# Patient Record
Sex: MALE | Race: WHITE | NOT HISPANIC OR LATINO | Employment: FULL TIME | URBAN - METROPOLITAN AREA
[De-identification: names, ages, dates, MRNs, and addresses within clinical notes are randomized per-mention and may not be internally consistent; named-entity substitution may affect disease eponyms.]

---

## 2023-10-15 PROBLEM — M54.16 BILATERAL LUMBAR RADICULOPATHY: Status: ACTIVE | Noted: 2023-10-15

## 2023-10-15 PROBLEM — M43.16 SPONDYLOLISTHESIS OF LUMBAR REGION: Status: ACTIVE | Noted: 2023-10-15

## 2023-10-15 PROBLEM — G83.4 CAUDA EQUINA SYNDROME (MULTI): Status: ACTIVE | Noted: 2023-10-15

## 2023-10-15 PROBLEM — R29.898 WEAKNESS OF BOTH LOWER EXTREMITIES: Status: ACTIVE | Noted: 2023-10-15

## 2023-10-15 PROBLEM — Z98.1 HISTORY OF FUSION OF LUMBAR SPINE: Status: ACTIVE | Noted: 2023-10-15

## 2023-10-15 PROBLEM — M48.061 DEGENERATIVE LUMBAR SPINAL STENOSIS: Status: ACTIVE | Noted: 2023-10-15

## 2023-10-15 PROBLEM — G47.00 INSOMNIA: Status: ACTIVE | Noted: 2023-10-15

## 2023-10-15 RX ORDER — ESZOPICLONE 3 MG/1
3 TABLET, FILM COATED ORAL NIGHTLY
COMMUNITY
End: 2024-04-25 | Stop reason: ALTCHOICE

## 2023-10-17 ENCOUNTER — OFFICE VISIT (OUTPATIENT)
Dept: NEUROSURGERY | Facility: CLINIC | Age: 73
End: 2023-10-17
Payer: MEDICARE

## 2023-10-17 VITALS
SYSTOLIC BLOOD PRESSURE: 118 MMHG | TEMPERATURE: 97.5 F | WEIGHT: 200 LBS | DIASTOLIC BLOOD PRESSURE: 72 MMHG | HEART RATE: 75 BPM | HEIGHT: 70 IN | BODY MASS INDEX: 28.63 KG/M2

## 2023-10-17 DIAGNOSIS — M54.16 BILATERAL LUMBAR RADICULOPATHY: Primary | ICD-10-CM

## 2023-10-17 PROCEDURE — 99213 OFFICE O/P EST LOW 20 MIN: CPT | Performed by: NEUROLOGICAL SURGERY

## 2023-10-17 PROCEDURE — 1036F TOBACCO NON-USER: CPT | Performed by: NEUROLOGICAL SURGERY

## 2023-10-17 PROCEDURE — 1159F MED LIST DOCD IN RCRD: CPT | Performed by: NEUROLOGICAL SURGERY

## 2023-10-17 PROCEDURE — 1125F AMNT PAIN NOTED PAIN PRSNT: CPT | Performed by: NEUROLOGICAL SURGERY

## 2023-10-17 RX ORDER — IBUPROFEN 200 MG
200 TABLET ORAL EVERY 8 HOURS PRN
COMMUNITY

## 2023-10-17 ASSESSMENT — PATIENT HEALTH QUESTIONNAIRE - PHQ9
SUM OF ALL RESPONSES TO PHQ9 QUESTIONS 1 & 2: 0
1. LITTLE INTEREST OR PLEASURE IN DOING THINGS: NOT AT ALL
2. FEELING DOWN, DEPRESSED OR HOPELESS: NOT AT ALL

## 2023-10-17 ASSESSMENT — PAIN SCALES - GENERAL: PAINLEVEL: 3

## 2023-10-31 NOTE — PROGRESS NOTES
[facility] [place]  Department of Neurological Surgery  Established Patient Visit    History of Present Illness  The patient is a 73-year-old male who returns to clinic for ongoing evaluation and management of lower extremity pain and weakness. He previously underwent an L4-5 laminectomy and L4-S1 posterior instrumented fusion in May 2022 at an outside hospital for treatment of acute cauda equina syndrome. He made a good recovery following surgery, but in late 2022 developed recurrent right lower extremity pain and weakness as well as worsening sensation in both legs. His prior imaging has shown a persistent L4-5 anterolisthesis as well as new mobile L3-4 retrolisthesis. There is also what appears to be extensive epidural lipomatosis at L5-S1. He has a pseudoarthrosis. He is moderate to severe central stenosis at L2-3, L3-4 and L5-S1, as well as severe bilateral foraminal stenosis at L4-5.  His prior EMG/nerve conduction study from April 2023 showed bilateral L5 and S1 radiculopathies, which were not previously seen.  When I last saw him in April 2023, we had agreed to hold off on any further surgery for the time being.  We agreed to proceed with another EMG/nerve conduction study in 3 months.    At this time, the patient states that overall things are fairly stable.  He did have an episode in May when he lost feeling in his legs and his legs gave out.  This was an isolated occurrence.  At this time, he reports low back pain when upright, which is worse with activity.  He also describes aching pain and numbness in his legs.    Patient's BMI is Body mass index is 29.11 kg/m².    14/14 systems reviewed and negative other than what is listed in the history of present illness    Patient Active Problem List   Diagnosis    Cauda equina syndrome (CMS/HCC)    Degenerative lumbar spinal stenosis    Spondylolisthesis of lumbar region    History of fusion of lumbar spine    Weakness of both lower extremities     Bilateral lumbar radiculopathy    Insomnia     History reviewed. No pertinent past medical history.  History reviewed. No pertinent surgical history.  Social History     Tobacco Use    Smoking status: Never    Smokeless tobacco: Never   Substance Use Topics    Alcohol use: Never     family history is not on file.    Current Outpatient Medications:     eszopiclone (Lunesta) 3 mg tablet, Take 1 tablet (3 mg) by mouth once daily at bedtime. Take immediately before bedtime, Disp: , Rfl:     ibuprofen 200 mg tablet, Take 1 tablet (200 mg) by mouth every 8 hours if needed., Disp: , Rfl:   Allergies   Allergen Reactions    Cefuroxime Rash       Physical Examination:    General: Well developed, awake/alert/oriented x3, no distress, alert and cooperative  Skin: Warm and dry, no lesions, no rashes  ENMT: Mucous membranes moist, no apparent injury, no lesions seen  Head/Neck: Neck Supple, no apparent injury  Respiratory/Thorax: Normal breath sounds with good chest expansion, thorax symmetric  Cardiovascular: No pitting edema, no JVD    Motor Strength: 4/5 strength in right TA/EHL, otherwise 5/5 throughout bilateral lower extremities    Muscle Bulk: Normal and symmetric in all extremities    Posture:   -- Cervical: Normal  -- Thoracic: Normal  -- Lumbar : Normal  Paraspinal muscle spasm/tenderness absent.     Sensation: Subjectively decreased over the lateral aspect of both calves into the feet      Results:  No new imaging to review    An EMG/nerve conduction study performed on July 19, 2023 was interpreted as showing chronic moderate to severe right L5 radiculopathy    Assessment and Plan:      Ernie Flores is a 73-year-old male who returns to clinic for ongoing evaluation and management of lower extremity pain and weakness. He previously underwent an L4-5 laminectomy and L4-S1 posterior instrumented fusion in May 2022 at an outside hospital for treatment of acute cauda equina syndrome. He made a good recovery following  surgery, but in late 2022 developed recurrent right lower extremity pain and weakness as well as worsening sensation in both legs. His prior imaging has shown a persistent L4-5 anterolisthesis as well as new mobile L3-4 retrolisthesis. There is also what appears to be extensive epidural lipomatosis at L5-S1. He has a pseudoarthrosis. He is moderate to severe central stenosis at L2-3, L3-4 and L5-S1, as well as severe bilateral foraminal stenosis at L4-5.  His prior EMG/nerve conduction study from April 2023 showed bilateral L5 and S1 radiculopathies, which were not previously seen.  When I last saw him in April 2023, we had agreed to hold off on any further surgery for the time being.  We agreed to proceed with another EMG/nerve conduction study in 3 months.  At this time, he states that overall things are fairly stable.  He has a persistent right foot drop as well as persistent numbness in an L5 distribution bilaterally.  His repeat EMG performed in July 2023 showed chronic moderate to severe right L5 radiculopathy.  At this time, we have agreed to continue to hold off on further surgery.  I will plan to see him back in clinic in 6 months.  We have agreed to have another EMG/nerve conduction study performed prior to that visit.  We will also get scoliosis films and lumbar flexion and extension films prior to that visit.    I have reviewed all prior documentation and reviewed the electronic medical record since admission. I have personally have reviewed all advanced imaging not just the reports and used my interpretation as documented as the relevant findings. I have reviewed the risks and benefits of all treatment recommendations listed in this note with the patient and family. I spent a total of 30 minutes in service to this patient's care during this date of service.      The above clinical summary has been dictated with voice recognition software. It has not been proofread for grammatical errors, typographical  mistakes, or other semantic inconsistencies.    Thank you for visiting our office today. It was our pleasure to take part in your healthcare.     Do not hesitate to call with any questions regarding your plan of care after leaving at [phone] M-F 8am-4pm.     To clinicians, thank you very much for this kind referral. It is a privilege to partner with you in the care of your patients. My office would be delighted to assist you with any further consultations or with questions regarding the plan of care outlined. Do not hesitate to call the office or contact me directly.       Sincerely,      Geraldo Clark MD PhD  Attending Neurosurgeon  [facility]   of Neurological Surgery  [facility]    [address]  [place], [place] [address] [place] [address]  [address] [place] [address]  [address]  [place] [address]    Office: [phone]  Fax: [phone]

## 2023-12-12 RX ORDER — ESZOPICLONE 3 MG/1
3 TABLET, FILM COATED ORAL NIGHTLY
Qty: 30 TABLET | Refills: 5 | OUTPATIENT
Start: 2023-12-12 | End: 2024-01-11

## 2024-04-23 ENCOUNTER — APPOINTMENT (OUTPATIENT)
Dept: NEUROSURGERY | Facility: CLINIC | Age: 74
End: 2024-04-23
Payer: MEDICARE

## 2024-04-25 ENCOUNTER — OFFICE VISIT (OUTPATIENT)
Dept: NEUROSURGERY | Facility: CLINIC | Age: 74
End: 2024-04-25
Payer: MEDICARE

## 2024-04-25 VITALS
BODY MASS INDEX: 29.35 KG/M2 | HEART RATE: 82 BPM | HEIGHT: 70 IN | TEMPERATURE: 97.1 F | SYSTOLIC BLOOD PRESSURE: 132 MMHG | DIASTOLIC BLOOD PRESSURE: 76 MMHG | WEIGHT: 205 LBS

## 2024-04-25 DIAGNOSIS — Z98.1 HISTORY OF FUSION OF LUMBAR SPINE: ICD-10-CM

## 2024-04-25 DIAGNOSIS — M43.16 SPONDYLOLISTHESIS OF LUMBAR REGION: ICD-10-CM

## 2024-04-25 DIAGNOSIS — G47.00 INSOMNIA, UNSPECIFIED TYPE: Primary | ICD-10-CM

## 2024-04-25 DIAGNOSIS — M54.16 BILATERAL LUMBAR RADICULOPATHY: Primary | ICD-10-CM

## 2024-04-25 PROCEDURE — 1159F MED LIST DOCD IN RCRD: CPT | Performed by: NEUROLOGICAL SURGERY

## 2024-04-25 PROCEDURE — 1036F TOBACCO NON-USER: CPT | Performed by: NEUROLOGICAL SURGERY

## 2024-04-25 PROCEDURE — 1125F AMNT PAIN NOTED PAIN PRSNT: CPT | Performed by: NEUROLOGICAL SURGERY

## 2024-04-25 PROCEDURE — 99214 OFFICE O/P EST MOD 30 MIN: CPT | Performed by: NEUROLOGICAL SURGERY

## 2024-04-25 RX ORDER — ESZOPICLONE 3 MG/1
3 TABLET, FILM COATED ORAL NIGHTLY
Qty: 90 TABLET | Refills: 0 | Status: SHIPPED | OUTPATIENT
Start: 2024-04-25 | End: 2024-07-24

## 2024-04-25 ASSESSMENT — PATIENT HEALTH QUESTIONNAIRE - PHQ9
2. FEELING DOWN, DEPRESSED OR HOPELESS: NOT AT ALL
1. LITTLE INTEREST OR PLEASURE IN DOING THINGS: NOT AT ALL
SUM OF ALL RESPONSES TO PHQ9 QUESTIONS 1 & 2: 0

## 2024-04-25 ASSESSMENT — PAIN SCALES - GENERAL: PAINLEVEL: 6

## 2024-04-25 NOTE — PROGRESS NOTES
[facility] [place]  Department of Neurological Surgery  Established Patient Visit    History of Present Illness  Ernie Flores is a 73 y.o. year old male who presents to the spine clinic in follow up for ongoing evaluation and management of lower extremity pain and weakness. He previously underwent an L4-5 laminectomy and L4-S1 posterior instrumented fusion in May 2022 at an outside hospital for treatment of acute cauda equina syndrome. He made a good recovery following surgery, but in late 2022 developed recurrent right lower extremity pain and weakness as well as worsening sensation in both legs. His prior imaging has shown a persistent L4-5 anterolisthesis as well as new mobile L3-4 retrolisthesis. There is also what appears to be extensive epidural lipomatosis at L5-S1. He has a pseudoarthrosis. He has moderate to severe central stenosis at L2-3, L3-4 and L5-S1, as well as severe bilateral foraminal stenosis at L4-5.  His prior EMG/nerve conduction study from April 2023 showed bilateral L5 and S1 radiculopathies, which were not previously seen.  His repeat EMG performed in July 2023 showed chronic moderate to severe right L5 radiculopathy.  I last saw him in October 2023.  At this time, he states that his low back and right lower extremity pain are mildly worse than they were in October.  That said, he is still able to do activities he enjoys like gardening.    Patient's BMI is Body mass index is 29.84 kg/m².    14/14 systems reviewed and negative other than what is listed in the history of present illness    Patient Active Problem List   Diagnosis    Cauda equina syndrome (Multi)    Degenerative lumbar spinal stenosis    Spondylolisthesis of lumbar region    History of fusion of lumbar spine    Weakness of both lower extremities    Bilateral lumbar radiculopathy    Insomnia     No past medical history on file.  No past surgical history on file.  Social History     Tobacco Use    Smoking  status: Never    Smokeless tobacco: Never   Substance Use Topics    Alcohol use: Never     family history is not on file.    Current Outpatient Medications:     eszopiclone (Lunesta) 3 mg tablet, Take 1 tablet (3 mg) by mouth once daily at bedtime. Take immediately before bedtime, Disp: , Rfl:     ibuprofen 200 mg tablet, Take 1 tablet (200 mg) by mouth every 8 hours if needed., Disp: , Rfl:   Allergies   Allergen Reactions    Cefuroxime Rash       Physical Examination:  General: well developed, awake/alert/oriented x3, no distress, alert and cooperative  Head/Neck: neck Supple, no apparent injury  ENMT: mucous membranes moist, no apparent injury, no lesions seen  Cardiovascular: no pitting edema, no JVD  Respiratory/Thorax: Normal breath sounds with good chest expansion, thorax symmetric  Skin: warm and dry, no lesions, no rashes    Neurological/Musculoskeletal:    - Posture: normal coronal & sagittal alignment    - Range of motion: Decreased lumbar range of motion    - Muscle Bulk: normal and symmetric in all extremities    -Mild tenderness to palpation over the mid lumbar spine    - Motor Strength: 4/5 strength in right TA/EHL, otherwise 5/5 strength throughout bilateral lower extremities    - Sensation: Subjectively decreased over the lateral aspect of both calves into the feet      Results:  No new imaging    Assessment and Plan:  Ernie Flores is a 73 y.o. year old male who presents to the spine clinic in follow up for ongoing evaluation and management of lower extremity pain and weakness. He previously underwent an L4-5 laminectomy and L4-S1 posterior instrumented fusion in May 2022 at an outside hospital for treatment of acute cauda equina syndrome. He made a good recovery following surgery, but in late 2022 developed recurrent right lower extremity pain and weakness as well as worsening sensation in both legs. His prior imaging has shown a persistent L4-5 anterolisthesis as well as new mobile L3-4  retrolisthesis. There is also what appears to be extensive epidural lipomatosis at L5-S1. He has a pseudoarthrosis. He has moderate to severe central stenosis at L2-3, L3-4 and L5-S1, as well as severe bilateral foraminal stenosis at L4-5.  His prior EMG/nerve conduction study from April 2023 showed bilateral L5 and S1 radiculopathies, which were not previously seen.  His repeat EMG performed in July 2023 showed chronic moderate to severe right L5 radiculopathy.  I last saw him in October 2023.  At this time, he states that his low back and right lower extremity pain are mildly worse than they were in October.  That said, he is still able to do activities he enjoys like gardening.  I had a lengthy discussion with the patient regarding his condition and treatment options.  We again discussed the possibility of proceeding with revision/extension surgery, likely L3- pelvis.  At this time, he would like to defer surgery.  Bushra this is reasonable.  I will plan to see him back in clinic in 6 months to assess his progress.  He will have a new EMG as well as EOS films and flexion/extension x-rays at that time.      I have reviewed all prior documentation and reviewed the electronic medical record since admission. I have personally have reviewed all advanced imaging not just the reports and used my interpretation as documented as the relevant findings. I have reviewed the risks and benefits of all treatment recommendations listed in this note with the patient and family. I spent a total of 30 minutes in service to this patient's care during this date of service.      The above clinical summary has been dictated with voice recognition software. It has not been proofread for grammatical errors, typographical mistakes, or other semantic inconsistencies.    Thank you for visiting our office today. It was our pleasure to take part in your healthcare.     Do not hesitate to call with any questions regarding your plan of care  after leaving at [phone] M-F 8am-4pm.     To clinicians, thank you very much for this kind referral. It is a privilege to partner with you in the care of your patients. My office would be delighted to assist you with any further consultations or with questions regarding the plan of care outlined. Do not hesitate to call the office or contact me directly.       Sincerely,      Geraldo Clark MD PhD  Attending Neurosurgeon  [facility]   of Neurological Surgery  [facility]    [address]  [place] [address]    [facility]  [address] [facility]  [address]  [place] [address]    Office: [phone]  Fax: [phone]

## 2024-09-24 ENCOUNTER — HOSPITAL ENCOUNTER (OUTPATIENT)
Dept: RADIOLOGY | Facility: HOSPITAL | Age: 74
Discharge: HOME | End: 2024-09-24
Payer: MEDICARE

## 2024-09-24 DIAGNOSIS — M43.16 SPONDYLOLISTHESIS OF LUMBAR REGION: ICD-10-CM

## 2024-09-24 DIAGNOSIS — Z98.1 HISTORY OF FUSION OF LUMBAR SPINE: ICD-10-CM

## 2024-09-24 PROCEDURE — 72082 X-RAY EXAM ENTIRE SPI 2/3 VW: CPT

## 2024-09-24 PROCEDURE — 72084 X-RAY EXAM ENTIRE SPI 6/> VW: CPT | Performed by: STUDENT IN AN ORGANIZED HEALTH CARE EDUCATION/TRAINING PROGRAM

## 2024-09-24 PROCEDURE — 72120 X-RAY BEND ONLY L-S SPINE: CPT

## 2024-10-10 ENCOUNTER — APPOINTMENT (OUTPATIENT)
Dept: NEUROSURGERY | Facility: CLINIC | Age: 74
End: 2024-10-10
Payer: MEDICARE

## 2024-10-29 ENCOUNTER — APPOINTMENT (OUTPATIENT)
Facility: CLINIC | Age: 74
End: 2024-10-29
Payer: MEDICARE

## 2024-10-29 VITALS
DIASTOLIC BLOOD PRESSURE: 70 MMHG | TEMPERATURE: 97.2 F | HEART RATE: 90 BPM | HEIGHT: 70 IN | WEIGHT: 210 LBS | BODY MASS INDEX: 30.06 KG/M2 | SYSTOLIC BLOOD PRESSURE: 120 MMHG

## 2024-10-29 DIAGNOSIS — M43.16 SPONDYLOLISTHESIS OF LUMBAR REGION: ICD-10-CM

## 2024-10-29 DIAGNOSIS — M54.16 BILATERAL LUMBAR RADICULOPATHY: Primary | ICD-10-CM

## 2024-10-29 RX ORDER — DIAZEPAM 5 MG/1
5 TABLET ORAL
Qty: 1 TABLET | Refills: 0 | Status: SHIPPED | OUTPATIENT
Start: 2024-10-29 | End: 2024-10-29

## 2024-10-29 ASSESSMENT — PAIN SCALES - GENERAL: PAINLEVEL_OUTOF10: 5

## 2024-10-29 ASSESSMENT — PATIENT HEALTH QUESTIONNAIRE - PHQ9
1. LITTLE INTEREST OR PLEASURE IN DOING THINGS: NOT AT ALL
SUM OF ALL RESPONSES TO PHQ9 QUESTIONS 1 & 2: 0
2. FEELING DOWN, DEPRESSED OR HOPELESS: NOT AT ALL

## 2024-11-03 ENCOUNTER — HOSPITAL ENCOUNTER (OUTPATIENT)
Dept: RADIOLOGY | Facility: HOSPITAL | Age: 74
Discharge: HOME | End: 2024-11-03
Payer: MEDICARE

## 2024-11-03 DIAGNOSIS — M54.16 BILATERAL LUMBAR RADICULOPATHY: ICD-10-CM

## 2024-11-03 PROCEDURE — 72148 MRI LUMBAR SPINE W/O DYE: CPT

## 2024-11-03 PROCEDURE — 72148 MRI LUMBAR SPINE W/O DYE: CPT | Performed by: RADIOLOGY

## 2024-11-27 ENCOUNTER — APPOINTMENT (OUTPATIENT)
Facility: CLINIC | Age: 74
End: 2024-11-27
Payer: MEDICARE

## 2024-12-03 ENCOUNTER — OFFICE VISIT (OUTPATIENT)
Facility: CLINIC | Age: 74
End: 2024-12-03
Payer: MEDICARE

## 2024-12-03 VITALS
TEMPERATURE: 97.5 F | DIASTOLIC BLOOD PRESSURE: 82 MMHG | HEIGHT: 70 IN | SYSTOLIC BLOOD PRESSURE: 118 MMHG | BODY MASS INDEX: 29.78 KG/M2 | WEIGHT: 208 LBS | HEART RATE: 78 BPM

## 2024-12-03 DIAGNOSIS — M43.16 SPONDYLOLISTHESIS OF LUMBAR REGION: ICD-10-CM

## 2024-12-03 DIAGNOSIS — S32.009K LUMBAR PSEUDOARTHROSIS: ICD-10-CM

## 2024-12-03 DIAGNOSIS — M54.16 BILATERAL LUMBAR RADICULOPATHY: Primary | ICD-10-CM

## 2024-12-03 ASSESSMENT — PATIENT HEALTH QUESTIONNAIRE - PHQ9
SUM OF ALL RESPONSES TO PHQ9 QUESTIONS 1 & 2: 1
10. IF YOU CHECKED OFF ANY PROBLEMS, HOW DIFFICULT HAVE THESE PROBLEMS MADE IT FOR YOU TO DO YOUR WORK, TAKE CARE OF THINGS AT HOME, OR GET ALONG WITH OTHER PEOPLE: SOMEWHAT DIFFICULT
1. LITTLE INTEREST OR PLEASURE IN DOING THINGS: SEVERAL DAYS
2. FEELING DOWN, DEPRESSED OR HOPELESS: NOT AT ALL

## 2024-12-03 ASSESSMENT — PAIN SCALES - GENERAL: PAINLEVEL_OUTOF10: 6

## 2024-12-03 NOTE — PROGRESS NOTES
[facility] [place]  Department of Neurological Surgery  Established Patient Visit    History of Present Illness  Ernie Flores is a 74 y.o. year old male who presents to the spine clinic in follow up with low back pain as well as lower extremity pain, weakness and paresthesias.  He previously underwent an L4-5 laminectomy and L4-S1 posterior instrumented fusion in May 2022 at an outside hospital for treatment of acute cauda equina syndrome. He made a good recovery following surgery, but in late 2022 developed recurrent right lower extremity pain and weakness as well as worsening sensation in both legs. His prior imaging has shown a persistent L4-5 anterolisthesis as well as new mobile L3-4 retrolisthesis. There is also what appears to be extensive epidural lipomatosis at L5-S1. He has a pseudoarthrosis.  His prior EMG/nerve conduction study from April 2023 showed bilateral L5 and S1 radiculopathies, which were not previously seen.  His repeat EMG performed in July 2023 showed chronic moderate to severe right L5 radiculopathy.  When I last saw him in October 2024, I had ordered a new MRI of the lumbar spine for further evaluation.  He is here today to go over the results of that study as well as for ongoing treatment discussion.  At this time, he states that he continues to have significant low back pain, which is intermittent.  He also has numbness over the anterior thighs as well as his persistent right dorsiflexion weakness.    BMI: Body mass index is 30.28 kg/m².    14/14 systems reviewed and negative other than what is listed in the history of present illness    Patient Active Problem List   Diagnosis    Cauda equina syndrome (Multi)    Degenerative lumbar spinal stenosis    Spondylolisthesis of lumbar region    History of fusion of lumbar spine    Weakness of both lower extremities    Bilateral lumbar radiculopathy    Insomnia     No past medical history on file.  No past surgical history on  file.  Social History     Tobacco Use    Smoking status: Never    Smokeless tobacco: Never   Substance Use Topics    Alcohol use: Never     family history is not on file.    Current Outpatient Medications:     eszopiclone (Lunesta) 3 mg tablet, Take 1 tablet (3 mg) by mouth once daily at bedtime. Take immediately before bedtime, Disp: 90 tablet, Rfl: 0    ibuprofen 200 mg tablet, Take 1 tablet (200 mg) by mouth every 8 hours if needed., Disp: , Rfl:   Allergies   Allergen Reactions    Cefuroxime Rash       Physical Examination:  General: well developed, awake/alert/oriented x3, no distress, alert and cooperative  Head/Neck: neck Supple, no apparent injury  ENMT: mucous membranes moist, no apparent injury, no lesions seen  Cardiovascular: no pitting edema, no JVD  Respiratory/Thorax: Normal breath sounds with good chest expansion, thorax symmetric  Skin: warm and dry, no lesions, no rashes     Neurological/Musculoskeletal:     - Posture: normal coronal & sagittal alignment     - Range of motion: Decreased lumbar range of motion     - Muscle Bulk: normal and symmetric in all extremities     -Mild tenderness to palpation over the mid lumbar spine     - Motor Strength: 4/5 strength in right TA/EHL, otherwise 5/5 strength throughout bilateral lower extremities     - Sensation: Subjectively decreased over the anterior thighs and lateral aspect of both calves into the feet      Results:  I personally reviewed and interpreted the imaging results, which included an MRI of the lumbar spine performed on November 3, 2024.  This shows a grade 1 anterolisthesis at L4-5 as well as grade 2 retrolisthesis at L3-4.  There is severe bilateral foraminal stenosis at L3-4.  There is moderate central stenosis at L2-3 and L3-4.    Assessment and Plan:  Ernie Flores is a 74 y.o. year old male who presents to the spine clinic in follow up with low back pain as well as lower extremity pain, weakness and paresthesias.  He previously underwent  an L4-5 laminectomy and L4-S1 posterior instrumented fusion in May 2022 at an outside hospital for treatment of acute cauda equina syndrome. He made a good recovery following surgery, but in late 2022 developed recurrent right lower extremity pain and weakness as well as worsening sensation in both legs. His prior imaging has shown a persistent L4-5 anterolisthesis as well as new mobile L3-4 retrolisthesis. There is also what appears to be extensive epidural lipomatosis at L5-S1. He has a pseudoarthrosis.  His prior EMG/nerve conduction study from April 2023 showed bilateral L5 and S1 radiculopathies, which were not previously seen.  His repeat EMG performed in July 2023 showed chronic moderate to severe right L5 radiculopathy.  When I last saw him in October 2024, I had ordered a new MRI of the lumbar spine, which showed a grade 1 anterolisthesis at L4-5 as well as grade 2 retrolisthesis at L3-4.  There is severe bilateral foraminal stenosis at L3-4.  There is moderate central stenosis at L2-3 and L3-4.  At this time, he states that he continues to have significant low back pain, which is intermittent.  He also has numbness over the anterior thighs as well as his persistent right dorsiflexion weakness.  I had a lengthy discussion with the patient regarding their condition and treatment options.  At this time, I am recommending an L3-4 XLIF and L3-S1 revision/extension posterior fusion.  This would be to treat his pseudoarthrosis at L4-S1 as well as to reduce his L4-5 anterolisthesis.  I discussed both further conservative care as well as different surgical approaches. I went over the risks associated with surgery, including infection, bleeding, neurologic injury, spinal fluid leak, and the need for further procedures. All of the patient's questions were answered and they have elected to proceed with surgery.        I have reviewed all prior documentation and reviewed the electronic medical record since admission. I  have personally have reviewed all advanced imaging not just the reports and used my interpretation as documented as the relevant findings. I have reviewed the risks and benefits of all treatment recommendations listed in this note with the patient and family.       The above clinical summary has been dictated with voice recognition software. It has not been proofread for grammatical errors, typographical mistakes, or other semantic inconsistencies.    Thank you for visiting our office today. It was our pleasure to take part in your healthcare.     Do not hesitate to call with any questions regarding your plan of care after leaving at [phone] M-F 8am-4pm.     To clinicians, thank you very much for this kind referral. It is a privilege to partner with you in the care of your patients. My office would be delighted to assist you with any further consultations or with questions regarding the plan of care outlined. Do not hesitate to call the office or contact me directly.       Sincerely,      Geraldo Clark MD PhD  Attending Neurosurgeon  [facility]   of Neurological Surgery  [facility]    [address]  [place] [address]    [facility]  [address]  [address]  [place] [address]    Office: [phone]  Fax: [phone]

## 2025-01-13 ENCOUNTER — CLINICAL SUPPORT (OUTPATIENT)
Dept: PREADMISSION TESTING | Facility: HOSPITAL | Age: 75
End: 2025-01-13
Payer: MEDICARE

## 2025-01-13 NOTE — CPM/PAT H&P
[facility]/PAT Evaluation       Name: Ernie Flores (Ernie Flores)  /Age: 1950/74 y.o.     {[facility] Visit Type:83485}      Chief Complaint: ***    HPI      Ernie Flores is scheduled for L3-4 Spine Lumbar Fusion Extreme Lateral XLIF; L3-S1 revision/extension posterior fusion with L4-5 and L5-S1 transforaminal lumbar interbody fusion on 25    **Data Input  No past medical history on file.    No past surgical history on file.    Patient  has no history on file for sexual activity.    No family history on file.    Allergies   Allergen Reactions    Cefuroxime Rash       Prior to Admission medications    Medication Sig Start Date End Date Taking? Authorizing Provider   eszopiclone (Lunesta) 3 mg tablet Take 1 tablet (3 mg) by mouth once daily at bedtime. Take immediately before bedtime 4/25/24 12/3/24  Geraldo Clark MD PhD   ibuprofen 200 mg tablet Take 1 tablet (200 mg) by mouth every 8 hours if needed.    Historical Provider, MD MORE [facility] Physical Exam     Airway      Testing/Diagnostic:       - MR Lumbar Spine: 24  IMPRESSION:  Postoperative changes status post L4-S1 laminectomy and bilateral pedicle screw/ananda fusion from L4-S1. As demonstrated on radiographic evaluation there is worsening degenerative change at L3-L4 with retrolisthesis and resultant severe bilateral L3 neural foraminal narrowing with compression of the exiting nerve roots. No spinal canal stenosis at this level.  Mild-to-moderate spinal canal stenosis at L2-L3, similar to prior  myelogram.          Patient Specialist/PCP:         NeuroSx: Geraldo Clark 24 follow up with low back pain as well as lower extremity pain, weakness and paresthesias.  He previously underwent an L4-5 laminectomy and L4-S1 posterior instrumented fusion in May 2022 at an outside hospital for treatment of acute cauda equina syndrome.   **MRI of the lumbar spine performed on November 3, 2024. This shows a grade 1 anterolisthesis at L4-5 as  well as grade 2 retrolisthesis at L3-4. There is severe bilateral foraminal stenosis at L3-4. There is moderate central stenosis at L2-3 and L3-4.         _______________________________________________________________  **Data input only. No medications, history or providers verified            Alyse Everett LPN  Preadmission Testing            Visit Vitals  Smoking Status Never       DASI Risk Score    No data to display       Caprini DVT Assessment    No data to display       Modified Frailty Index    No data to display       CHADS2 Stroke Risk  Current as of 4 days ago        N/A 3 to 100%: High Risk   2 to < 3%: Medium Risk   0 to < 2%: Low Risk     Last Change: N/A          This score determines the patient's risk of having a stroke if the patient has atrial fibrillation.        This score is not applicable to this patient. Components are not calculated.          Revised Cardiac Risk Index    No data to display       Apfel Simplified Score    No data to display       Risk Analysis Index Results This Encounter    No data found in the last 10 encounters.       Prodigy: High Risk  Total Score: 20              Prodigy Age Score      Prodigy Gender Score          ARISCAT Score for Postoperative Pulmonary Complications    No data to display       Leon Perioperative Risk for Myocardial Infarction or Cardiac Arrest (HARITHA)    No data to display         Assessment and Plan:     {[facility] ASSESSMENT AND PLAN:69622}

## 2025-01-17 ENCOUNTER — PRE-ADMISSION TESTING (OUTPATIENT)
Dept: PREADMISSION TESTING | Facility: HOSPITAL | Age: 75
End: 2025-01-17
Payer: MEDICARE

## 2025-01-17 ENCOUNTER — HOSPITAL ENCOUNTER (OUTPATIENT)
Dept: CARDIOLOGY | Facility: HOSPITAL | Age: 75
Discharge: HOME | End: 2025-01-17
Payer: MEDICARE

## 2025-01-17 ENCOUNTER — HOSPITAL ENCOUNTER (OUTPATIENT)
Dept: RADIOLOGY | Facility: HOSPITAL | Age: 75
Discharge: HOME | End: 2025-01-17
Payer: MEDICARE

## 2025-01-17 VITALS
TEMPERATURE: 97.4 F | HEIGHT: 69 IN | OXYGEN SATURATION: 98 % | HEART RATE: 64 BPM | SYSTOLIC BLOOD PRESSURE: 116 MMHG | DIASTOLIC BLOOD PRESSURE: 75 MMHG | BODY MASS INDEX: 32.78 KG/M2 | WEIGHT: 221.3 LBS

## 2025-01-17 DIAGNOSIS — S32.009K LUMBAR PSEUDOARTHROSIS: ICD-10-CM

## 2025-01-17 DIAGNOSIS — M43.16 SPONDYLOLISTHESIS OF LUMBAR REGION: ICD-10-CM

## 2025-01-17 DIAGNOSIS — M54.16 BILATERAL LUMBAR RADICULOPATHY: ICD-10-CM

## 2025-01-17 LAB
ABO GROUP (TYPE) IN BLOOD: NORMAL
ANION GAP SERPL CALC-SCNC: 15 MMOL/L (ref 10–20)
ANTIBODY SCREEN: NORMAL
BASOPHILS # BLD AUTO: 0.06 X10*3/UL (ref 0–0.1)
BASOPHILS NFR BLD AUTO: 0.8 %
BUN SERPL-MCNC: 26 MG/DL (ref 6–23)
CALCIUM SERPL-MCNC: 9.1 MG/DL (ref 8.6–10.6)
CHLORIDE SERPL-SCNC: 104 MMOL/L (ref 98–107)
CO2 SERPL-SCNC: 26 MMOL/L (ref 21–32)
CREAT SERPL-MCNC: 1.13 MG/DL (ref 0.5–1.3)
EGFRCR SERPLBLD CKD-EPI 2021: 67 ML/MIN/1.73M*2
EOSINOPHIL # BLD AUTO: 0.15 X10*3/UL (ref 0–0.4)
EOSINOPHIL NFR BLD AUTO: 2 %
ERYTHROCYTE [DISTWIDTH] IN BLOOD BY AUTOMATED COUNT: 12.8 % (ref 11.5–14.5)
GLUCOSE SERPL-MCNC: 100 MG/DL (ref 74–99)
HCT VFR BLD AUTO: 43.4 % (ref 41–52)
HGB BLD-MCNC: 14.9 G/DL (ref 13.5–17.5)
IMM GRANULOCYTES # BLD AUTO: 0.08 X10*3/UL (ref 0–0.5)
IMM GRANULOCYTES NFR BLD AUTO: 1.1 % (ref 0–0.9)
LYMPHOCYTES # BLD AUTO: 1.14 X10*3/UL (ref 0.8–3)
LYMPHOCYTES NFR BLD AUTO: 15.5 %
MCH RBC QN AUTO: 31.8 PG (ref 26–34)
MCHC RBC AUTO-ENTMCNC: 34.3 G/DL (ref 32–36)
MCV RBC AUTO: 93 FL (ref 80–100)
MONOCYTES # BLD AUTO: 0.73 X10*3/UL (ref 0.05–0.8)
MONOCYTES NFR BLD AUTO: 9.9 %
NEUTROPHILS # BLD AUTO: 5.19 X10*3/UL (ref 1.6–5.5)
NEUTROPHILS NFR BLD AUTO: 70.7 %
NRBC BLD-RTO: 0 /100 WBCS (ref 0–0)
PLATELET # BLD AUTO: 218 X10*3/UL (ref 150–450)
POTASSIUM SERPL-SCNC: 4.8 MMOL/L (ref 3.5–5.3)
PREALB SERPL-MCNC: 26.3 MG/DL (ref 18–40)
RBC # BLD AUTO: 4.69 X10*6/UL (ref 4.5–5.9)
RH FACTOR (ANTIGEN D): NORMAL
SODIUM SERPL-SCNC: 140 MMOL/L (ref 136–145)
WBC # BLD AUTO: 7.4 X10*3/UL (ref 4.4–11.3)

## 2025-01-17 PROCEDURE — 85025 COMPLETE CBC W/AUTO DIFF WBC: CPT

## 2025-01-17 PROCEDURE — 84134 ASSAY OF PREALBUMIN: CPT

## 2025-01-17 PROCEDURE — 36415 COLL VENOUS BLD VENIPUNCTURE: CPT

## 2025-01-17 PROCEDURE — 93010 ELECTROCARDIOGRAM REPORT: CPT | Performed by: INTERNAL MEDICINE

## 2025-01-17 PROCEDURE — 86900 BLOOD TYPING SEROLOGIC ABO: CPT

## 2025-01-17 PROCEDURE — 93005 ELECTROCARDIOGRAM TRACING: CPT

## 2025-01-17 PROCEDURE — 99204 OFFICE O/P NEW MOD 45 MIN: CPT | Performed by: NURSE PRACTITIONER

## 2025-01-17 PROCEDURE — 71046 X-RAY EXAM CHEST 2 VIEWS: CPT

## 2025-01-17 PROCEDURE — 80048 BASIC METABOLIC PNL TOTAL CA: CPT

## 2025-01-17 PROCEDURE — 87081 CULTURE SCREEN ONLY: CPT

## 2025-01-17 RX ORDER — METOPROLOL SUCCINATE 25 MG/1
25 TABLET, EXTENDED RELEASE ORAL AS NEEDED
COMMUNITY

## 2025-01-17 RX ORDER — CHLORHEXIDINE GLUCONATE 40 MG/ML
SOLUTION TOPICAL
Qty: 473 ML | Refills: 0 | Status: SHIPPED | OUTPATIENT
Start: 2025-01-17

## 2025-01-17 RX ORDER — CHLORHEXIDINE GLUCONATE ORAL RINSE 1.2 MG/ML
SOLUTION DENTAL
Qty: 15 ML | Refills: 0 | Status: SHIPPED | OUTPATIENT
Start: 2025-01-17

## 2025-01-17 ASSESSMENT — DUKE ACTIVITY SCORE INDEX (DASI)
CAN YOU HAVE SEXUAL RELATIONS: YES
CAN YOU TAKE CARE OF YOURSELF (EAT, DRESS, BATHE, OR USE TOILET): YES
CAN YOU DO YARD WORK LIKE RAKING LEAVES, WEEDING OR PUSHING A MOWER: NO
DASI METS SCORE: 5.4
CAN YOU RUN A SHORT DISTANCE: NO
CAN YOU DO LIGHT WORK AROUND THE HOUSE LIKE DUSTING OR WASHING DISHES: YES
CAN YOU DO MODERATE WORK AROUND THE HOUSE LIKE VACUUMING, SWEEPING FLOORS OR CARRYING GROCERIES: YES
CAN YOU CLIMB A FLIGHT OF STAIRS OR WALK UP A HILL: YES
CAN YOU PARTICIPATE IN STRENOUS SPORTS LIKE SWIMMING, SINGLES TENNIS, FOOTBALL, BASKETBALL, OR SKIING: NO
CAN YOU PARTICIPATE IN MODERATE RECREATIONAL ACTIVITIES LIKE GOLF, BOWLING, DANCING, DOUBLES TENNIS OR THROWING A BASEBALL OR FOOTBALL: NO
CAN YOU WALK A BLOCK OR TWO ON LEVEL GROUND: NO
TOTAL_SCORE: 21.45
CAN YOU DO HEAVY WORK AROUND THE HOUSE LIKE SCRUBBING FLOORS OR LIFTING AND MOVING HEAVY FURNITURE: NO
CAN YOU WALK INDOORS, SUCH AS AROUND YOUR HOUSE: YES

## 2025-01-17 ASSESSMENT — ENCOUNTER SYMPTOMS
CONSTITUTIONAL NEGATIVE: 1
NECK NEGATIVE: 1
CARDIOVASCULAR NEGATIVE: 1
ARTHRALGIAS: 1
RESPIRATORY NEGATIVE: 1
NEUROLOGICAL NEGATIVE: 1
EYES NEGATIVE: 1
ENDOCRINE NEGATIVE: 1
GASTROINTESTINAL NEGATIVE: 1
MYALGIAS: 1

## 2025-01-17 ASSESSMENT — LIFESTYLE VARIABLES: SMOKING_STATUS: NONSMOKER

## 2025-01-17 NOTE — CPM/PAT H&P
CPM/PAT Evaluation       Name: Ernie Flores (Ernie Flores)  /Age: 1950/74 y.o.     Visit Type:   In-Person       Chief Complaint: perioperative evaluation      HPI  The patient is a 74 year old  male with complaints of low back and lower extremity pain. He also endorses weakness and paresthesias. He previously underwent an L4-5 laminectomy and L4-S1 posterior instrumented fusion in May 2022 at an outside hospital for treatment of acute cauda equina syndrome. His most recent imaging demonstrates a grade 1 anterolisthesis at L4-5 as well as grade 2 retrolisthesis at L3-4. There is severe bilateral foraminal stenosis at L3-4. There is moderate central stenosis at L2-3 and L3-4. He presents today for perioperative evaluation in anticipation of L3-4 Spine Lumbar Fusion Extreme Lateral XLIF; L3-S1 revision/extension posterior fusion with L4-5 and L5-S1 transforaminal lumbar interbody fusion on 25 with Dr. Clark.    Past Medical History:   Diagnosis Date    Anemia     Bilateral lumbar radiculopathy     seen by Dr. Geraldo Clark 24    BPH (benign prostatic hyperplasia)     s/p ablation    Cataract     s/p correction    Cauda equina syndrome (Multi)     s/p Lumbar lamniectomy in     Insomnia     managed with Lunesta    Irritable bowel syndrome     Spinal stenosis     Vision loss     wears glasses       Past Surgical History:   Procedure Laterality Date    APPENDECTOMY      in childhood    HERNIA REPAIR      b/l inguinal    KNEE ARTHROPLASTY      LUMBAR LAMINECTOMY         Patient Sexual activity questions deferred to the physician.    Family History   Problem Relation Name Age of Onset    Cancer Mother         Allergies   Allergen Reactions    Cefuroxime Rash       Prior to Admission medications    Medication Sig Start Date End Date Taking? Authorizing Provider   eszopiclone (Lunesta) 3 mg tablet Take 1 tablet (3 mg) by mouth once daily at bedtime. Take immediately before bedtime  "4/25/24 12/3/24  Geraldo Clark MD PhD   ibuprofen 200 mg tablet Take 1 tablet (200 mg) by mouth every 8 hours if needed.    Historical Provider, MD MORE ROS:   Constitutional:   neg    Neuro/Psych:    Lower extremity paresthesias  neg    Eyes:   neg     use of corrective lenses  Ears:   neg    Nose:   neg    Mouth:   neg    Throat:   neg    Neck:   neg    Cardio:   neg     peripheral edema (chronic, unchanged)  Respiratory:   neg    Endocrine:   neg    GI:   neg    :   neg    Musculoskeletal:    arthralgias   myalgias  Hematologic:   neg    Skin:  neg        Physical Exam  Vitals reviewed.   Constitutional:       Appearance: Normal appearance.   HENT:      Head: Normocephalic and atraumatic.      Nose: Nose normal.      Mouth/Throat:      Mouth: Mucous membranes are moist.      Pharynx: Oropharynx is clear.   Eyes:      Extraocular Movements: Extraocular movements intact.      Pupils: Pupils are equal, round, and reactive to light.   Cardiovascular:      Rate and Rhythm: Normal rate and regular rhythm.      Pulses: Normal pulses.      Heart sounds: Normal heart sounds.   Pulmonary:      Effort: Pulmonary effort is normal.      Breath sounds: Normal breath sounds.   Musculoskeletal:         General: Normal range of motion.      Cervical back: Normal range of motion.   Skin:     General: Skin is warm and dry.   Neurological:      General: No focal deficit present.      Mental Status: He is alert and oriented to person, place, and time.   Psychiatric:         Mood and Affect: Mood normal.         Behavior: Behavior normal.          PAT AIRWAY:   Airway:     Mallampati::  IV    Neck ROM::  Full  normal          Visit Vitals  /75   Pulse 64   Temp 36.3 °C (97.4 °F)   Ht 1.753 m (5' 9\")   Wt 100 kg (221 lb 4.8 oz)   SpO2 98%   BMI 32.68 kg/m²   Smoking Status Never   BSA 2.21 m²       DASI Risk Score      Flowsheet Row Pre-Admission Testing from [facility]/17/2025 in [facility]   Can you take " care of yourself (eat, dress, bathe, or use toilet)?  2.75 filed at 01/17/2025 1100   Can you walk indoors, such as around your house? 1.75 filed at 01/17/2025 1100   Can you walk a block or two on level ground?  0 filed at 01/17/2025 1100   Can you climb a flight of stairs or walk up a hill? 5.5 filed at 01/17/2025 1100   Can you run a short distance? 0 filed at 01/17/2025 1100   Can you do light work around the house like dusting or washing dishes? 2.7 filed at 01/17/2025 1100   Can you do moderate work around the house like vacuuming, sweeping floors or carrying groceries? 3.5 filed at 01/17/2025 1100   Can you do heavy work around the house like scrubbing floors or lifting and moving heavy furniture?  0 filed at 01/17/2025 1100   Can you do yard work like raking leaves, weeding or pushing a mower? 0 filed at 01/17/2025 1100   Can you have sexual relations? 5.25 filed at 01/17/2025 1100   Can you participate in moderate recreational activities like golf, bowling, dancing, doubles tennis or throwing a baseball or football? 0 filed at 01/17/2025 1100   Can you participate in strenous sports like swimming, singles tennis, football, basketball, or skiing? 0 filed at 01/17/2025 1100   DASI SCORE 21.45 filed at 01/17/2025 1100   METS Score (Will be calculated only when all the questions are answered) 5.4 filed at 01/17/2025 1100          Caprini DVT Assessment      Flowsheet Row Pre-Admission Testing from 1/17/2025 in [facility]   DVT Score (IF A SCORE IS NOT CALCULATING, MUST SELECT A BMI TO COMPLETE) 9 filed at 01/17/2025 1052   Surgical Factors Major surgery planned, lasting over 3 hours filed at 01/17/2025 1052   BMI (BMI MUST BE CHOSEN) 30 or less filed at 01/17/2025 1052          Modified Frailty Index      Flowsheet Row Pre-Admission Testing from 1/17/2025 in [facility]   Non-independent functional status (problems with dressing, bathing, personal grooming, or cooking) 0 filed  at 01/17/2025 1052   History of diabetes mellitus  0 filed at 01/17/2025 1052   History of COPD 0 filed at 01/17/2025 1052   History of CHF No filed at 01/17/2025 1052   History of MI 0 filed at 01/17/2025 1052   History of Percutaneous Coronary Intervention, Cardiac Surgery, or Angina No filed at 01/17/2025 1052   Hypertension requiring the use of medication  0 filed at 01/17/2025 1052   Peripheral vascular disease 0 filed at 01/17/2025 1052   Impaired sensorium (cognitive impairement or loss, clouding, or delirium) 0 filed at 01/17/2025 1052   TIA or CVA withouy residual deficit 0 filed at 01/17/2025 1052   Cerebrovascular accident with deficit 0 filed at 01/17/2025 1052   Modified Frailty Index Calculator 0 filed at 01/17/2025 1052          CHADS2 Stroke Risk  Current as of about an hour ago        N/A 3 to 100%: High Risk   2 to < 3%: Medium Risk   0 to < 2%: Low Risk     Last Change: N/A          This score determines the patient's risk of having a stroke if the patient has atrial fibrillation.        This score is not applicable to this patient. Components are not calculated.          Revised Cardiac Risk Index      Flowsheet Row Pre-Admission Testing from 1/17/2025 in [facility]   High-Risk Surgery (Intraperitoneal, Intrathoracic,Suprainguinal vascular) 0 filed at 01/17/2025 1052   History of ischemic heart disease (History of MI, History of positive exercuse test, Current chest paint considered due to myocardial ischemia, Use of nitrate therapy, ECG with pathological Q Waves) 0 filed at 01/17/2025 1052   History of congestive heart failure (pulmonary edemia, bilateral rales or S3 gallop, Paroxysmal nocturnal dyspnea, CXR showing pulmonary vascular redistribution) 0 filed at 01/17/2025 1052   History of cerebrovascular disease (Prior TIA or stroke) 0 filed at 01/17/2025 1052   Pre-operative insulin treatment 0 filed at 01/17/2025 1052   Pre-operative creatinine>2 mg/dl 0 filed at 01/17/2025  1052   Revised Cardiac Risk Calculator 0 filed at 01/17/2025 1052          Apfel Simplified Score      Flowsheet Row Pre-Admission Testing from 1/17/2025 in [facility]   Smoking status 1 filed at 01/17/2025 1052   History of motion sickness or PONV  0 filed at 01/17/2025 1052   Use of postoperative opioids 1 filed at 01/17/2025 1052   Gender - Female 0=No filed at 01/17/2025 1052   Apfel Simplified Score Calculator 2 filed at 01/17/2025 1052          Risk Analysis Index Results This Encounter    No data found in the last 10 encounters.       Stop Bang Score      Flowsheet Row Pre-Admission Testing from 1/17/2025 in [facility]   Do you snore loudly? 0 filed at 01/17/2025 1100   Do you often feel tired or fatigued after your sleep? 0 filed at 01/17/2025 1100   Has anyone ever observed you stop breathing in your sleep? 0 filed at 01/17/2025 1100   Do you have or are you being treated for high blood pressure? 0 filed at 01/17/2025 1100   Recent BMI (Calculated) 30.3 filed at 01/17/2025 1100   Is BMI greater than 35 kg/m2? 0=No filed at 01/17/2025 1100   Age older than 50 years old? 1=Yes filed at 01/17/2025 1100   Is your neck circumference greater than 17 inches (Male) or 16 inches (Female)? 0 filed at 01/17/2025 1100   Gender - Male 1=Yes filed at 01/17/2025 1100   STOP-BANG Total Score 2 filed at 01/17/2025 1100          Prodigy: High Risk  Total Score: 20              Prodigy Age Score      Prodigy Gender Score          ARISCAT Score for Postoperative Pulmonary Complications    No data to display       Leon Perioperative Risk for Myocardial Infarction or Cardiac Arrest (HARITHA)    No data to display       Recent Results (from the past 4 weeks)   Basic Metabolic Panel    Collection Time: 01/17/25 11:18 AM   Result Value Ref Range    Glucose 100 (H) 74 - 99 mg/dL    Sodium 140 136 - 145 mmol/L    Potassium 4.8 3.5 - 5.3 mmol/L    Chloride 104 98 - 107 mmol/L    Bicarbonate 26 21 - 32  mmol/L    Anion Gap 15 10 - 20 mmol/L    Urea Nitrogen 26 (H) 6 - 23 mg/dL    Creatinine 1.13 0.50 - 1.30 mg/dL    eGFR 67 >60 mL/min/1.73m*2    Calcium 9.1 8.6 - 10.6 mg/dL   Prealbumin    Collection Time: 01/17/25 11:18 AM   Result Value Ref Range    Prealbumin 26.3 18.0 - 40.0 mg/dL   CBC and Auto Differential    Collection Time: 01/17/25 11:18 AM   Result Value Ref Range    WBC 7.4 4.4 - 11.3 x10*3/uL    nRBC 0.0 0.0 - 0.0 /100 WBCs    RBC 4.69 4.50 - 5.90 x10*6/uL    Hemoglobin 14.9 13.5 - 17.5 g/dL    Hematocrit 43.4 41.0 - 52.0 %    MCV 93 80 - 100 fL    MCH 31.8 26.0 - 34.0 pg    MCHC 34.3 32.0 - 36.0 g/dL    RDW 12.8 11.5 - 14.5 %    Platelets 218 150 - 450 x10*3/uL    Neutrophils % 70.7 40.0 - 80.0 %    Immature Granulocytes %, Automated 1.1 (H) 0.0 - 0.9 %    Lymphocytes % 15.5 13.0 - 44.0 %    Monocytes % 9.9 2.0 - 10.0 %    Eosinophils % 2.0 0.0 - 6.0 %    Basophils % 0.8 0.0 - 2.0 %    Neutrophils Absolute 5.19 1.60 - 5.50 x10*3/uL    Immature Granulocytes Absolute, Automated 0.08 0.00 - 0.50 x10*3/uL    Lymphocytes Absolute 1.14 0.80 - 3.00 x10*3/uL    Monocytes Absolute 0.73 0.05 - 0.80 x10*3/uL    Eosinophils Absolute 0.15 0.00 - 0.40 x10*3/uL    Basophils Absolute 0.06 0.00 - 0.10 x10*3/uL   Type And Screen    Collection Time: 01/17/25 11:18 AM   Result Value Ref Range    ABO TYPE O     Rh TYPE NEG     ANTIBODY SCREEN NEG    MRSA pending    Diagnostic Results    EKG 1/17/25  Normal sinus rhythm  Normal ECG    Assessment and Plan:     Anesthesia  The patient denies problems with anesthesia in the past such as PONV, prolonged sedation, awareness, dental damage, aspiration, cardiac arrest, difficult intubation, or unexpected hospital admissions.     Neurology  The patient has no neurological diagnoses or significant findings on chart review, clinical presentation, and evaluation. No grossly apparent neurological perioperative risk. The patient is at increased risk for postoperative delirium secondary  to age 65 or older. The patient is at increased risk for perioperative stroke secondary to general anesthesia, operative time >2.5 hours. Handouts for preoperative brain exercises given to patient.    HEENT/Airway  No diagnoses, significant findings on chart review, clinical presentation, or evaluation. No documented or reported history of airway difficulty.     Cardiovascular  #Tachycardia  -takes metoprolol as needed, instructed to continue as prescribed in the perioperative period. HR today 64.    METS  The patient's functional capacity is greater than 4 METS.  RCRI  The patient meets 0 RCRI criteria and therefor has a 3.9% risk of major adverse cardiac complications.  HARITHA score which indicates a 0% risk of intraoperative or 30-day postoperative MACE (major adverse cardiac event).    Cardiology Evaluation  The patient is not followed by cardiology.    He is scheduled for non-cardiac surgery associated with elevated risk. The patient has no major cardiac contraindications to non- cardiac surgery.    Pulmonary  No significant findings on chart review or clinical presentation and evaluation. Reports recent URI treated with abx. No current complaints of fever, chills, cough, wheeze or sob. Patient sent for preop cxray as ordered by Dr. Clark.    The patient is at increased risk of perioperative pulmonary complications secondary to advanced age greater than 60.    STOP BANG 2, which places patient at low risk for having MIQUEL.  ARISCAT 26, Intermediate, 13.3% risk of in-hospital postoperative pulmonary complications  PRODIGY 20, high risk of respiratory depression episode.     Patient given PI sheet for preoperative deep breathing exercises.  Encourage  incentive spirometry in the postoperative period as deemed necessary.    Endocrine  No diagnoses or significant findings on chart review or clinical presentation and evaluation.    Gastrointestinal  #IBS  -no current meds or complaints.     Eat 10- 0,  self-perceived  oropharyngeal dysphagia scale (0-40)     Genitourinary  No diagnoses or significant findings on chart review or clinical presentation and evaluation.    Renal  No renal diagnoses or significant findings on chart review or clinical presentation and evaluation. The patient has specific risk factors associated with increased risk of perioperative renal complications related to age greater than 55, male gender. Preventative measures include preoperative hydration.    Hematology  The patient reports history of idiopathic thrombocytopenia with most recent plt count 120s (done via [facility] unable to view). Repeat CBC obtained.    Caprini score 9, high risk of perioperative VTE.     Patient instructed to ambulate as soon as possible postoperatively to decrease thromboembolic risk. Initiate mechanical DVT prophylaxis as soon as possible and initiate chemical prophylaxis when deemed safe from a bleeding standpoint post surgery.     Transfusion Evaluation  A type and screen was obtained given the likelihood for perioperative transfusion of blood or blood products.    Musculoskeletal  #Lumbar stenosis  -scheduled for surgery with DR. Clark on 1/27/25.    ID  No diagnoses or significant findings on chart review or clinical presentation and evaluation. MRSA screening obtained. Prescriptions and instructions given for Hibiclens and Peridex.    -Preoperative medication instructions were provided and reviewed with the patient.  Any additional testing or evaluation was explained to the patient.  NPO Instructions were discussed, and the patient's questions were answered prior to conclusion of this encounter. Patient verbalized understanding of preoperative instructions. After Visit Summary given.

## 2025-01-17 NOTE — H&P (VIEW-ONLY)
CPM/PAT Evaluation       Name: Ernie Flores (Ernie Flores)  /Age: 1950/74 y.o.     Visit Type:   In-Person       Chief Complaint: perioperative evaluation      HPI  The patient is a 74 year old  male with complaints of low back and lower extremity pain. He also endorses weakness and paresthesias. He previously underwent an L4-5 laminectomy and L4-S1 posterior instrumented fusion in May 2022 at an outside hospital for treatment of acute cauda equina syndrome. His most recent imaging demonstrates a grade 1 anterolisthesis at L4-5 as well as grade 2 retrolisthesis at L3-4. There is severe bilateral foraminal stenosis at L3-4. There is moderate central stenosis at L2-3 and L3-4. He presents today for perioperative evaluation in anticipation of L3-4 Spine Lumbar Fusion Extreme Lateral XLIF; L3-S1 revision/extension posterior fusion with L4-5 and L5-S1 transforaminal lumbar interbody fusion on 25 with Dr. Clark.    Past Medical History:   Diagnosis Date    Anemia     Bilateral lumbar radiculopathy     seen by Dr. Geraldo Clark 24    BPH (benign prostatic hyperplasia)     s/p ablation    Cataract     s/p correction    Cauda equina syndrome (Multi)     s/p Lumbar lamniectomy in     Insomnia     managed with Lunesta    Irritable bowel syndrome     Spinal stenosis     Vision loss     wears glasses       Past Surgical History:   Procedure Laterality Date    APPENDECTOMY      in childhood    HERNIA REPAIR      b/l inguinal    KNEE ARTHROPLASTY      LUMBAR LAMINECTOMY         Patient Sexual activity questions deferred to the physician.    Family History   Problem Relation Name Age of Onset    Cancer Mother         Allergies   Allergen Reactions    Cefuroxime Rash       Prior to Admission medications    Medication Sig Start Date End Date Taking? Authorizing Provider   eszopiclone (Lunesta) 3 mg tablet Take 1 tablet (3 mg) by mouth once daily at bedtime. Take immediately before bedtime  "4/25/24 12/3/24  Geraldo Clark MD PhD   ibuprofen 200 mg tablet Take 1 tablet (200 mg) by mouth every 8 hours if needed.    Historical Provider, MD MORE ROS:   Constitutional:   neg    Neuro/Psych:    Lower extremity paresthesias  neg    Eyes:   neg     use of corrective lenses  Ears:   neg    Nose:   neg    Mouth:   neg    Throat:   neg    Neck:   neg    Cardio:   neg     peripheral edema (chronic, unchanged)  Respiratory:   neg    Endocrine:   neg    GI:   neg    :   neg    Musculoskeletal:    arthralgias   myalgias  Hematologic:   neg    Skin:  neg        Physical Exam  Vitals reviewed.   Constitutional:       Appearance: Normal appearance.   HENT:      Head: Normocephalic and atraumatic.      Nose: Nose normal.      Mouth/Throat:      Mouth: Mucous membranes are moist.      Pharynx: Oropharynx is clear.   Eyes:      Extraocular Movements: Extraocular movements intact.      Pupils: Pupils are equal, round, and reactive to light.   Cardiovascular:      Rate and Rhythm: Normal rate and regular rhythm.      Pulses: Normal pulses.      Heart sounds: Normal heart sounds.   Pulmonary:      Effort: Pulmonary effort is normal.      Breath sounds: Normal breath sounds.   Musculoskeletal:         General: Normal range of motion.      Cervical back: Normal range of motion.   Skin:     General: Skin is warm and dry.   Neurological:      General: No focal deficit present.      Mental Status: He is alert and oriented to person, place, and time.   Psychiatric:         Mood and Affect: Mood normal.         Behavior: Behavior normal.          PAT AIRWAY:   Airway:     Mallampati::  IV    Neck ROM::  Full  normal          Visit Vitals  /75   Pulse 64   Temp 36.3 °C (97.4 °F)   Ht 1.753 m (5' 9\")   Wt 100 kg (221 lb 4.8 oz)   SpO2 98%   BMI 32.68 kg/m²   Smoking Status Never   BSA 2.21 m²       DASI Risk Score      Flowsheet Row Pre-Admission Testing from 1/17/2025 in [facility]   Can you take " care of yourself (eat, dress, bathe, or use toilet)?  2.75 filed at 01/17/2025 1100   Can you walk indoors, such as around your house? 1.75 filed at 01/17/2025 1100   Can you walk a block or two on level ground?  0 filed at 01/17/2025 1100   Can you climb a flight of stairs or walk up a hill? 5.5 filed at 01/17/2025 1100   Can you run a short distance? 0 filed at 01/17/2025 1100   Can you do light work around the house like dusting or washing dishes? 2.7 filed at 01/17/2025 1100   Can you do moderate work around the house like vacuuming, sweeping floors or carrying groceries? 3.5 filed at 01/17/2025 1100   Can you do heavy work around the house like scrubbing floors or lifting and moving heavy furniture?  0 filed at 01/17/2025 1100   Can you do yard work like raking leaves, weeding or pushing a mower? 0 filed at 01/17/2025 1100   Can you have sexual relations? 5.25 filed at 01/17/2025 1100   Can you participate in moderate recreational activities like golf, bowling, dancing, doubles tennis or throwing a baseball or football? 0 filed at 01/17/2025 1100   Can you participate in strenous sports like swimming, singles tennis, football, basketball, or skiing? 0 filed at 01/17/2025 1100   DASI SCORE 21.45 filed at 01/17/2025 1100   METS Score (Will be calculated only when all the questions are answered) 5.4 filed at 01/17/2025 1100          Caprini DVT Assessment      Flowsheet Row Pre-Admission Testing from 1/17/20[facility] in [facility]   DVT Score (IF A SCORE IS NOT CALCULATING, MUST SELECT A BMI TO COMPLETE) 9 filed at 01/17/2025 1052   Surgical Factors Major surgery planned, lasting over 3 hours filed at 01/17/2025 1052   BMI (BMI MUST BE CHOSEN) 30 or less filed at 01/17/2025 1052          Modified Frailty Index      Flowsheet Row Pre-Admission Testing from 1/17/2025 in [facility]   Non-independent functional status (problems with dressing, bathing, personal grooming, or cooking) 0 filed  at 01/17/2025 1052   History of diabetes mellitus  0 filed at 01/17/2025 1052   History of COPD 0 filed at 01/17/2025 1052   History of CHF No filed at 01/17/2025 1052   History of MI 0 filed at 01/17/2025 1052   History of Percutaneous Coronary Intervention, Cardiac Surgery, or Angina No filed at 01/17/2025 1052   Hypertension requiring the use of medication  0 filed at 01/17/2025 1052   Peripheral vascular disease 0 filed at 01/17/2025 1052   Impaired sensorium (cognitive impairement or loss, clouding, or delirium) 0 filed at 01/17/2025 1052   TIA or CVA withouy residual deficit 0 filed at 01/17/2025 1052   Cerebrovascular accident with deficit 0 filed at 01/17/2025 1052   Modified Frailty Index Calculator 0 filed at 01/17/2025 1052          CHADS2 Stroke Risk  Current as of about an hour ago        N/A 3 to 100%: High Risk   2 to < 3%: Medium Risk   0 to < 2%: Low Risk     Last Change: N/A          This score determines the patient's risk of having a stroke if the patient has atrial fibrillation.        This score is not applicable to this patient. Components are not calculated.          Revised Cardiac Risk Index      Flowsheet Row Pre-Admission Testing from 1/17/2025 in [facility]   High-Risk Surgery (Intraperitoneal, Intrathoracic,Suprainguinal vascular) 0 filed at 01/17/2025 1052   History of ischemic heart disease (History of MI, History of positive exercuse test, Current chest paint considered due to myocardial ischemia, Use of nitrate therapy, ECG with pathological Q Waves) 0 filed at 01/17/2025 1052   History of congestive heart failure (pulmonary edemia, bilateral rales or S3 gallop, Paroxysmal nocturnal dyspnea, CXR showing pulmonary vascular redistribution) 0 filed at 01/17/2025 1052   History of cerebrovascular disease (Prior TIA or stroke) 0 filed at 01/17/2025 1052   Pre-operative insulin treatment 0 filed at 01/17/2025 1052   Pre-operative creatinine>2 mg/dl 0 filed at 01/17/2025  1052   Revised Cardiac Risk Calculator 0 filed at 01/17/2025 1052          Apfel Simplified Score      Flowsheet Row Pre-Admission Testing from 1/17/2025 in [facility]   Smoking status 1 filed at 01/17/2025 1052   History of motion sickness or PONV  0 filed at 01/17/2025 1052   Use of postoperative opioids 1 filed at 01/17/2025 1052   Gender - Female 0=No filed at 01/17/2025 1052   Apfel Simplified Score Calculator 2 filed at 01/17/2025 1052          Risk Analysis Index Results This Encounter    No data found in the last 10 encounters.       Stop Bang Score      Flowsheet Row Pre-Admission Testing from 1/17/2025 in [facility]   Do you snore loudly? 0 filed at 01/17/2025 1100   Do you often feel tired or fatigued after your sleep? 0 filed at 01/17/2025 1100   Has anyone ever observed you stop breathing in your sleep? 0 filed at 01/17/2025 1100   Do you have or are you being treated for high blood pressure? 0 filed at 01/17/2025 1100   Recent BMI (Calculated) 30.3 filed at 01/17/2025 1100   Is BMI greater than 35 kg/m2? 0=No filed at 01/17/2025 1100   Age older than 50 years old? 1=Yes filed at 01/17/2025 1100   Is your neck circumference greater than 17 inches (Male) or 16 inches (Female)? 0 filed at 01/17/2025 1100   Gender - Male 1=Yes filed at 01/17/2025 1100   STOP-BANG Total Score 2 filed at 01/17/2025 1100          Prodigy: High Risk  Total Score: 20              Prodigy Age Score      Prodigy Gender Score          ARISCAT Score for Postoperative Pulmonary Complications    No data to display       Leon Perioperative Risk for Myocardial Infarction or Cardiac Arrest (HARITHA)    No data to display       Recent Results (from the past 4 weeks)   Basic Metabolic Panel    Collection Time: 01/17/25 11:18 AM   Result Value Ref Range    Glucose 100 (H) 74 - 99 mg/dL    Sodium 140 136 - 145 mmol/L    Potassium 4.8 3.5 - 5.3 mmol/L    Chloride 104 98 - 107 mmol/L    Bicarbonate 26 21 - 32  mmol/L    Anion Gap 15 10 - 20 mmol/L    Urea Nitrogen 26 (H) 6 - 23 mg/dL    Creatinine 1.13 0.50 - 1.30 mg/dL    eGFR 67 >60 mL/min/1.73m*2    Calcium 9.1 8.6 - 10.6 mg/dL   Prealbumin    Collection Time: 01/17/25 11:18 AM   Result Value Ref Range    Prealbumin 26.3 18.0 - 40.0 mg/dL   CBC and Auto Differential    Collection Time: 01/17/25 11:18 AM   Result Value Ref Range    WBC 7.4 4.4 - 11.3 x10*3/uL    nRBC 0.0 0.0 - 0.0 /100 WBCs    RBC 4.69 4.50 - 5.90 x10*6/uL    Hemoglobin 14.9 13.5 - 17.5 g/dL    Hematocrit 43.4 41.0 - 52.0 %    MCV 93 80 - 100 fL    MCH 31.8 26.0 - 34.0 pg    MCHC 34.3 32.0 - 36.0 g/dL    RDW 12.8 11.5 - 14.5 %    Platelets 218 150 - 450 x10*3/uL    Neutrophils % 70.7 40.0 - 80.0 %    Immature Granulocytes %, Automated 1.1 (H) 0.0 - 0.9 %    Lymphocytes % 15.5 13.0 - 44.0 %    Monocytes % 9.9 2.0 - 10.0 %    Eosinophils % 2.0 0.0 - 6.0 %    Basophils % 0.8 0.0 - 2.0 %    Neutrophils Absolute 5.19 1.60 - 5.50 x10*3/uL    Immature Granulocytes Absolute, Automated 0.08 0.00 - 0.50 x10*3/uL    Lymphocytes Absolute 1.14 0.80 - 3.00 x10*3/uL    Monocytes Absolute 0.73 0.05 - 0.80 x10*3/uL    Eosinophils Absolute 0.15 0.00 - 0.40 x10*3/uL    Basophils Absolute 0.06 0.00 - 0.10 x10*3/uL   Type And Screen    Collection Time: 01/17/25 11:18 AM   Result Value Ref Range    ABO TYPE O     Rh TYPE NEG     ANTIBODY SCREEN NEG    MRSA pending    Diagnostic Results    EKG 1/17/25  Normal sinus rhythm  Normal ECG    Assessment and Plan:     Anesthesia  The patient denies problems with anesthesia in the past such as PONV, prolonged sedation, awareness, dental damage, aspiration, cardiac arrest, difficult intubation, or unexpected hospital admissions.     Neurology  The patient has no neurological diagnoses or significant findings on chart review, clinical presentation, and evaluation. No grossly apparent neurological perioperative risk. The patient is at increased risk for postoperative delirium secondary  to age 65 or older. The patient is at increased risk for perioperative stroke secondary to general anesthesia, operative time >2.5 hours. Handouts for preoperative brain exercises given to patient.    HEENT/Airway  No diagnoses, significant findings on chart review, clinical presentation, or evaluation. No documented or reported history of airway difficulty.     Cardiovascular  #Tachycardia  -takes metoprolol as needed, instructed to continue as prescribed in the perioperative period. HR today 64.    METS  The patient's functional capacity is greater than 4 METS.  RCRI  The patient meets 0 RCRI criteria and therefor has a 3.9% risk of major adverse cardiac complications.  HARITHA score which indicates a 0% risk of intraoperative or 30-day postoperative MACE (major adverse cardiac event).    Cardiology Evaluation  The patient is not followed by cardiology.    He is scheduled for non-cardiac surgery associated with elevated risk. The patient has no major cardiac contraindications to non- cardiac surgery.    Pulmonary  No significant findings on chart review or clinical presentation and evaluation. Reports recent URI treated with abx. No current complaints of fever, chills, cough, wheeze or sob. Patient sent for preop cxray as ordered by Dr. Clark.    The patient is at increased risk of perioperative pulmonary complications secondary to advanced age greater than 60.    STOP BANG 2, which places patient at low risk for having MIQUEL.  ARISCAT 26, Intermediate, 13.3% risk of in-hospital postoperative pulmonary complications  PRODIGY 20, high risk of respiratory depression episode.     Patient given PI sheet for preoperative deep breathing exercises.  Encourage  incentive spirometry in the postoperative period as deemed necessary.    Endocrine  No diagnoses or significant findings on chart review or clinical presentation and evaluation.    Gastrointestinal  #IBS  -no current meds or complaints.     Eat 10- 0,  self-perceived  oropharyngeal dysphagia scale (0-40)     Genitourinary  No diagnoses or significant findings on chart review or clinical presentation and evaluation.    Renal  No renal diagnoses or significant findings on chart review or clinical presentation and evaluation. The patient has specific risk factors associated with increased risk of perioperative renal complications related to age greater than 55, male gender. Preventative measures include preoperative hydration.    Hematology  The patient reports history of idiopathic thrombocytopenia with most recent plt count 120s (done via [facility] unable to view). Repeat CBC obtained.    Caprini score 9, high risk of perioperative VTE.     Patient instructed to ambulate as soon as possible postoperatively to decrease thromboembolic risk. Initiate mechanical DVT prophylaxis as soon as possible and initiate chemical prophylaxis when deemed safe from a bleeding standpoint post surgery.     Transfusion Evaluation  A type and screen was obtained given the likelihood for perioperative transfusion of blood or blood products.    Musculoskeletal  #Lumbar stenosis  -scheduled for surgery with DR. Clark on 1/27/25.    ID  No diagnoses or significant findings on chart review or clinical presentation and evaluation. MRSA screening obtained. Prescriptions and instructions given for Hibiclens and Peridex.    -Preoperative medication instructions were provided and reviewed with the patient.  Any additional testing or evaluation was explained to the patient.  NPO Instructions were discussed, and the patient's questions were answered prior to conclusion of this encounter. Patient verbalized understanding of preoperative instructions. After Visit Summary given.

## 2025-01-17 NOTE — PREPROCEDURE INSTRUCTIONS
Fasting Guidelines    NPO Instructions:    Do not eat any food after midnight the night before your surgery/procedure.  You may have up to 13.5 ounces of clear liquids until TWO hours before your instructed arrival time to the hospital. This includes water, black tea/coffee, (no milk or cream), apple juice, and/or electrolyte drinks (Gatorade).  You may chew gum up to TWO hours before your surgery/procedure.    Additional Instructions:     We have sent a prescription for Hibiclens soap and Peridex mouth wash to your preferred pharmacy.  If you have not already, Please  your prescription and start using as directed before surgery.  Follow the instruction sheet provided to you at your CPM/PAT appointment.    Avoid herbal supplements, multivitamins and NSAIDS (non-steroidal anti-inflammatory drugs) such as Advil, Aleve, Ibuprofen, Naproxen, Excedrin, Meloxicam or Celebrex for at least 7 days prior to surgery. May take Tylenol as needed.    Avoid tobacco and alcohol products for 24 hours prior to surgery.    CONTACT SURGEON'S OFFICE IF YOU DEVELOP:  * Fever = 100.4 F   * New respiratory symptoms (e.g. cough, shortness of breath, respiratory distress, sore throat)  * Recent loss of taste or smell  *Flu like symptoms such as headache, fatigue or gastrointestinal symptoms  * You develop any open sores, shingles, burning or painful urination   AND/OR:  * You no longer wish to have the surgery.  * Any other personal circumstances change that may lead to the need to cancel or defer this surgery.  *You were admitted to any hospital within one week of your planned procedure.    Seven/Six Days before Surgery:  Review your medication instructions, stop indicated medications    Day of Surgery:  Review your medication instructions, take indicated medications  Wear comfortable loose fitting clothing  Do not use moisturizers, creams, lotions or perfume  All jewelry and valuables should be left at home    Guilherme Lemus  [facility]  Vmozp-[phone]  [phone]  Email-[email or web address]      Patient Information: Pre-Operative Infection Prevention Measures     Why did I have my nose, under my arms, and groin swabbed?  The purpose of the swab is to identify Staphylococcus aureus inside your nose or on your skin.  The swab was sent to the laboratory for culture.  A positive swab/culture for Staphylococcus aureus is called colonization or carriage.      What is Staphylococcus aureus?  Staphylococcus aureus, also known as “staph”, is a germ found on the skin or in the nose of healthy people.  Sometimes Staphylococcus aureus can get into the body and cause an infection.  This can be minor (such as pimples, boils, or other skin problems).  It might also be serious (such as a blood infection, pneumonia, or a surgical site infection).    What is Staphylococcus aureus colonization or carriage?  Colonization or carriage means that a person has the germ but is not sick from it.  These bacteria can be spread on the hands or when breathing or sneezing.    How is Staphylococcus aureus spread?  It is most often spread by close contact with a person or item that carries it.    What happens if my culture is positive for Staphylococcus aureus?  Your doctor/medical team will use this information to guide any antibiotic treatment which may be necessary.  Regardless of the culture results, we will clean the inside of your nose with a betadine swab just before you have your surgery.      Will I get an infection if I have Staphylococcus aureus in my nose or on my skin?  Anyone can get an infection with Staphylococcus aureus.  However, the best way to reduce your risk of infection is to follow the instructions provided to you for the use of your CHG soap and dental rinse.        Patient Information: Oral/Dental Rinse    What is oral/dental rinse?   It is a mouthwash. It is a way of cleaning the mouth with a  germ-killing solution before your surgery.  The solution contains chlorhexidine, commonly known as CHG.   It is used inside the mouth to kill a bacteria known as Staphylococcus aureus.  Let your doctor know if you are allergic to Chlorhexidine.    Why do I need to use CHG oral/dental rinse?  The CHG oral/dental rinse helps to kill a bacteria in your mouth known as Staphylococcus aureus.     This reduces the risk of infection at the surgical site.      Using your CHG oral/dental rinse  STEPS:  Use your CHG oral/dental rinse after you brush your teeth the night before (at bedtime) and the morning of your surgery.  Follow all directions on your prescription label.    Use the cap on the container to measure 15ml   Swish (gargle if you can) the mouthwash in your mouth for at least 30 seconds, (do not swallow) and spit out  After you use your CHG rinse, do not rinse your mouth with water, drink or eat.  Please refer to the prescription label for the appropriate time to resume oral intake      What side effects might I have using the CHG oral/dental rinse?  CHG rinse will stick to plaque on the teeth.  Brush and floss just before use.  Teeth brushing will help avoid staining of plaque during use.      Patient Information: Home Preoperative Antibacterial Shower      What is a home preoperative antibacterial shower?  This shower is a way of cleaning the skin with a germ-killing solution before surgery.  The solution contains chlorhexidine, commonly known as CHG.  CHG is a skin cleanser with germ-killing ability.  Let your doctor know if you are allergic to chlorhexidine.    Why do I need to take a preoperative antibacterial shower?  Skin is not sterile.  It is best to try to make your skin as free of germs as possible before surgery.  Proper cleansing with a germ-killing soap before surgery can lower the number of germs on your skin.  This helps to reduce the risk of infection at the surgical site.  Following the  instructions listed below will help you prepare your skin for surgery.      How do I use the solution?  Steps:  Begin using your CHG soap 5 days before your scheduled surgery on ________________________.    First, wash and rinse your hair using the CHG soap. Keep CHG soap away from ear canals and eyes.  Rinse completely, do not condition.  Hair extensions should be removed.  Wash your face with your normal soap and rinse.    Apply the CHG solution to a clean wet washcloth.  Turn the water off or move away from the water spray to avoid premature rinsing of the CHG soap as you are applying.   Firmly lather your entire body from the neck down.  Do not use on your face.  Pay special attention to the area(s) where your incision(s) will be located unless they are on your face.  Avoid scrubbing your skin too hard.  The important point is to have the CHG soap sit on your skin for 3 minutes.    When the 3 minutes are up, turn on the water and rinse the CHG solution off your body completely.   DO NOT wash with regular soap after you have used the CHG soap solution  Pat yourself dry with a clean, freshly-laundered towel.  DO NOT apply powders, deodorants, or lotions.  Dress in clean, freshly laundered nightclothes.    Be sure to sleep with clean, freshly laundered sheets.  Be aware that CHG will cause stains on fabrics; if you wash them with bleach after use.  Rinse your washcloth and other linens that have contact with CHG completely.  Use only non-chlorine detergents to launder the items used.   The morning of surgery is the fifth day.  Repeat the above steps and dress in clean comfortable clothing     Whom should I contact if I have any questions regarding the use of CHG soap?  Call the [facility] at [phone] if you have any questions.               Preoperative Brain Exercises    What are brain exercises?  A brain exercise is any activity that  engages your thinking (cognitive) skills.    What types of activities are considered brain exercises?  Jigsaw puzzles, crossword puzzles, word jumble, memory games, word search, and many more.  Many can be found free online or on your phone via a mobile dariana.    Why should I do brain exercises before my surgery?  More recent research has shown brain exercise before surgery can lower the risk of postoperative delirium (confusion) which can be especially important for older adults.  Patients who did brain exercises for 5 to 10 hours the days before surgery, cut their risk of postoperative delirium in half up to 1 week after surgery.         [facility]    Preoperative Deep Breathing Exercises    Why it is important to do deep breathing exercises before my surgery?  Deep breathing exercises strengthen your breathing muscles.  This helps you to recover after your surgery and decreases the chance of breathing complications.      How are the deep breathing exercises done?  Sit straight with your back supported.  Breathe in deeply and slowly through your nose. Your lower rib cage should expand and your abdomen may move forward.  Hold that breath for 3 to 5 seconds.  Breathe out through pursed lips, slowly and completely.  Rest and repeat 10 times every hour while awake.  Rest longer if you become dizzy or lightheaded.         Patient and Family Education             Ways You Can Help Prevent Blood Clots             This handout explains some simple things you can do to help prevent blood clots.      Blood clots are blockages that can form in the body's veins. When a blood clot forms in your deep veins, it may be called a deep vein thrombosis, or DVT for short. Blood clots can happen in any part of the body where blood flows, but they are most common in the arms and legs. If a piece of a blood clot breaks free and travels to the lungs, it is called a pulmonary embolus (PE). A PE can be a very  serious problem.         Being in the hospital or having surgery can raise your chances of getting a blood clot because you may not be well enough to move around as much as you normally do.         Ways you can help prevent blood clots in the hospital         Wearing SCDs. SCDs stands for Sequential Compression Devices.   SCDs are special sleeves that wrap around your legs  They attach to a pump that fills them with air to gently squeeze your legs every few minutes.   This helps return the blood in your legs to your heart.   SCDs should only be taken off when walking or bathing.   SCDs may not be comfortable, but they can help save your life.               Wearing compression stockings - if your doctor orders them. These special snug fitting stockings gently squeeze your legs to help blood flow.       Walking. Walking helps move the blood in your legs.   If your doctor says it is ok, try walking the halls at least   5 times a day. Ask us to help you get up, so you don't fall.      Taking any blood thinning medicines your doctor orders.        Page 1 of 2     [facility]; 3/23   Ways you can help prevent blood clots at home       Wearing compression stockings - if your doctor orders them. ? Walking - to help move the blood in your legs.       Taking any blood thinning medicines your doctor orders.      Signs of a blood clot or PE      Tell your doctor or nurse know right away if you have of the problems listed below.    If you are at home, seek medical care right away. Call 911 for chest pain or problems breathing.               Signs of a blood clot (DVT) - such as pain,  swelling, redness or warmth in your arm or leg      Signs of a pulmonary embolism (PE) - such as chest     pain or feeling short of breath

## 2025-01-18 LAB — STAPHYLOCOCCUS SPEC CULT: NORMAL

## 2025-01-20 PROCEDURE — 93005 ELECTROCARDIOGRAM TRACING: CPT

## 2025-01-21 LAB
ATRIAL RATE: 67 BPM
P AXIS: 31 DEGREES
P OFFSET: 214 MS
P ONSET: 163 MS
PR INTERVAL: 130 MS
Q ONSET: 228 MS
QRS COUNT: 12 BEATS
QRS DURATION: 72 MS
QT INTERVAL: 382 MS
QTC CALCULATION(BAZETT): 403 MS
QTC FREDERICIA: 396 MS
R AXIS: -27 DEGREES
T AXIS: -6 DEGREES
T OFFSET: 419 MS
VENTRICULAR RATE: 67 BPM

## 2025-01-24 RX ORDER — MELATONIN 10 MG
10 CAPSULE ORAL NIGHTLY
COMMUNITY

## 2025-01-24 NOTE — PROGRESS NOTES
Pharmacy Medication History Review    Ernie Flores is a 74 y.o. male who is planned to be admitted for Lumbar pseudoarthrosis. Pharmacy called the patient prior to their scheduled procedure and reviewed the patient's [id number] medications for accuracy.    Medications ADDED:  Melatonin 10mg  Medications CHANGED:  Metoprolol succinate 25mg directions from #1QD to #1PRN if pulse >100  Medications REMOVED:   none    Please review updated prior to admission medication list and comments regarding how patient may be taking medications differently by going to Admission tab --> Admission Orders --> Admit Orders / Review prior to admission medications.     Preferred pharmacy, last doses of medications, and allergies to be confirmed with patient by nursing the day of procedure.     Sources used to complete the med history include:  [facility]  Pharmacy dispense history  Patient interview  Chart Review  Care Everywhere     Below are additional concerns with the patient's PTA list.  Patient states they are taking #1 tablet of eszopiclone 3mg nightly. L.F. 04/26/24 #90/90d. OARRS verified.   Patient states they take #1 tablet of metoprolol succinate 25mg as needed if pulse is greater than 100. They do NOT take daily. There is no fill history to verify medication or dose    Kaylee Colindres  Elizabeth  Please reach out via Secure Chat for questions or call [employer] or [facility]

## 2025-01-26 ENCOUNTER — ANESTHESIA EVENT (OUTPATIENT)
Dept: OPERATING ROOM | Facility: HOSPITAL | Age: 75
End: 2025-01-26
Payer: MEDICARE

## 2025-01-27 ENCOUNTER — APPOINTMENT (OUTPATIENT)
Dept: RADIOLOGY | Facility: HOSPITAL | Age: 75
End: 2025-01-27
Payer: MEDICARE

## 2025-01-27 ENCOUNTER — ANESTHESIA (OUTPATIENT)
Dept: OPERATING ROOM | Facility: HOSPITAL | Age: 75
End: 2025-01-27
Payer: MEDICARE

## 2025-01-27 ENCOUNTER — HOSPITAL ENCOUNTER (INPATIENT)
Facility: HOSPITAL | Age: 75
LOS: 4 days | Discharge: NURSING, REHAB OR LONG-TERM CARE | End: 2025-01-31
Attending: NEUROLOGICAL SURGERY | Admitting: NEUROLOGICAL SURGERY
Payer: MEDICARE

## 2025-01-27 DIAGNOSIS — G89.18 ACUTE POST-OPERATIVE PAIN: ICD-10-CM

## 2025-01-27 DIAGNOSIS — M43.16 SPONDYLOLISTHESIS OF LUMBAR REGION: ICD-10-CM

## 2025-01-27 DIAGNOSIS — S32.009K LUMBAR PSEUDOARTHROSIS: ICD-10-CM

## 2025-01-27 DIAGNOSIS — Z98.890 HISTORY OF LUMBOSACRAL SPINE SURGERY: ICD-10-CM

## 2025-01-27 DIAGNOSIS — M54.16 BILATERAL LUMBAR RADICULOPATHY: Primary | ICD-10-CM

## 2025-01-27 LAB
ABO GROUP (TYPE) IN BLOOD: NORMAL
ANION GAP BLDA CALCULATED.4IONS-SCNC: 10 MMO/L (ref 10–25)
BASE EXCESS BLDA CALC-SCNC: 1.3 MMOL/L (ref -2–3)
BODY TEMPERATURE: 37 DEGREES CELSIUS
CA-I BLDA-SCNC: 1.16 MMOL/L (ref 1.1–1.33)
CHLORIDE BLDA-SCNC: 102 MMOL/L (ref 98–107)
GLUCOSE BLDA-MCNC: 156 MG/DL (ref 74–99)
HCO3 BLDA-SCNC: 25.9 MMOL/L (ref 22–26)
HCT VFR BLD EST: 56 % (ref 41–52)
HGB BLDA-MCNC: 18.7 G/DL (ref 13.5–17.5)
INHALED O2 CONCENTRATION: 45 %
LACTATE BLDA-SCNC: 1.6 MMOL/L (ref 0.4–2)
OXYHGB MFR BLDA: 96.9 % (ref 94–98)
PCO2 BLDA: 40 MM HG (ref 38–42)
PH BLDA: 7.42 PH (ref 7.38–7.42)
PO2 BLDA: 222 MM HG (ref 85–95)
POTASSIUM BLDA-SCNC: 4.1 MMOL/L (ref 3.5–5.3)
RH FACTOR (ANTIGEN D): NORMAL
SAO2 % BLDA: 100 % (ref 94–100)
SODIUM BLDA-SCNC: 134 MMOL/L (ref 136–145)

## 2025-01-27 PROCEDURE — 3600000018 HC OR TIME - INITIAL BASE CHARGE - PROCEDURE LEVEL SIX: Performed by: NEUROLOGICAL SURGERY

## 2025-01-27 PROCEDURE — 37799 UNLISTED PX VASCULAR SURGERY: CPT | Performed by: ANESTHESIOLOGIST ASSISTANT

## 2025-01-27 PROCEDURE — 2500000001 HC RX 250 WO HCPCS SELF ADMINISTERED DRUGS (ALT 637 FOR MEDICARE OP): Performed by: STUDENT IN AN ORGANIZED HEALTH CARE EDUCATION/TRAINING PROGRAM

## 2025-01-27 PROCEDURE — A22558 PR ARTHRODESIS ANT INTERBODY MIN DISCECTOMY,LUMBAR: Performed by: ANESTHESIOLOGY

## 2025-01-27 PROCEDURE — 3600000017 HC OR TIME - EACH INCREMENTAL 1 MINUTE - PROCEDURE LEVEL SIX: Performed by: NEUROLOGICAL SURGERY

## 2025-01-27 PROCEDURE — 82435 ASSAY OF BLOOD CHLORIDE: CPT | Performed by: ANESTHESIOLOGIST ASSISTANT

## 2025-01-27 PROCEDURE — 22614 ARTHRD PST TQ 1NTRSPC EA ADD: CPT | Performed by: NEUROLOGICAL SURGERY

## 2025-01-27 PROCEDURE — C1781 MESH (IMPLANTABLE): HCPCS | Performed by: NEUROLOGICAL SURGERY

## 2025-01-27 PROCEDURE — 0SP304Z REMOVAL OF INTERNAL FIXATION DEVICE FROM LUMBOSACRAL JOINT, OPEN APPROACH: ICD-10-PCS | Performed by: NEUROLOGICAL SURGERY

## 2025-01-27 PROCEDURE — 2500000004 HC RX 250 GENERAL PHARMACY W/ HCPCS (ALT 636 FOR OP/ED): Performed by: NEUROLOGICAL SURGERY

## 2025-01-27 PROCEDURE — 2500000004 HC RX 250 GENERAL PHARMACY W/ HCPCS (ALT 636 FOR OP/ED): Performed by: ANESTHESIOLOGY

## 2025-01-27 PROCEDURE — 1100000001 HC PRIVATE ROOM DAILY

## 2025-01-27 PROCEDURE — 3700000001 HC GENERAL ANESTHESIA TIME - INITIAL BASE CHARGE: Performed by: NEUROLOGICAL SURGERY

## 2025-01-27 PROCEDURE — 22633 ARTHRD CMBN 1NTRSPC LUMBAR: CPT | Performed by: NEUROLOGICAL SURGERY

## 2025-01-27 PROCEDURE — 2500000001 HC RX 250 WO HCPCS SELF ADMINISTERED DRUGS (ALT 637 FOR MEDICARE OP): Performed by: NEUROLOGICAL SURGERY

## 2025-01-27 PROCEDURE — 3700000002 HC GENERAL ANESTHESIA TIME - EACH INCREMENTAL 1 MINUTE: Performed by: NEUROLOGICAL SURGERY

## 2025-01-27 PROCEDURE — 22214 INCIS 1 VERTEBRAL SEG LUMBAR: CPT | Performed by: NEUROLOGICAL SURGERY

## 2025-01-27 PROCEDURE — C1821 INTERSPINOUS IMPLANT: HCPCS | Performed by: NEUROLOGICAL SURGERY

## 2025-01-27 PROCEDURE — 2500000005 HC RX 250 GENERAL PHARMACY W/O HCPCS: Performed by: NEUROLOGICAL SURGERY

## 2025-01-27 PROCEDURE — A22558 PR ARTHRODESIS ANT INTERBODY MIN DISCECTOMY,LUMBAR: Performed by: ANESTHESIOLOGIST ASSISTANT

## 2025-01-27 PROCEDURE — 2500000004 HC RX 250 GENERAL PHARMACY W/ HCPCS (ALT 636 FOR OP/ED): Performed by: ANESTHESIOLOGIST ASSISTANT

## 2025-01-27 PROCEDURE — 7100000002 HC RECOVERY ROOM TIME - EACH INCREMENTAL 1 MINUTE: Performed by: NEUROLOGICAL SURGERY

## 2025-01-27 PROCEDURE — XRGC0R7 FUSION OF 2 OR MORE LUMBAR VERTEBRAL JOINTS USING CUSTOM-MADE ANATOMICALLY DESIGNED INTERBODY FUSION DEVICE, OPEN APPROACH, NEW TECHNOLOGY GROUP 7: ICD-10-PCS | Performed by: NEUROLOGICAL SURGERY

## 2025-01-27 PROCEDURE — 0SG30AJ FUSION OF LUMBOSACRAL JOINT WITH INTERBODY FUSION DEVICE, POSTERIOR APPROACH, ANTERIOR COLUMN, OPEN APPROACH: ICD-10-PCS | Performed by: NEUROLOGICAL SURGERY

## 2025-01-27 PROCEDURE — 36620 INSERTION CATHETER ARTERY: CPT | Performed by: ANESTHESIOLOGY

## 2025-01-27 PROCEDURE — 22853 INSJ BIOMECHANICAL DEVICE: CPT | Performed by: NEUROLOGICAL SURGERY

## 2025-01-27 PROCEDURE — 7100000001 HC RECOVERY ROOM TIME - INITIAL BASE CHARGE: Performed by: NEUROLOGICAL SURGERY

## 2025-01-27 PROCEDURE — 2500000005 HC RX 250 GENERAL PHARMACY W/O HCPCS: Performed by: ANESTHESIOLOGIST ASSISTANT

## 2025-01-27 PROCEDURE — C1713 ANCHOR/SCREW BN/BN,TIS/BN: HCPCS | Performed by: NEUROLOGICAL SURGERY

## 2025-01-27 PROCEDURE — 0SG3071 FUSION OF LUMBOSACRAL JOINT WITH AUTOLOGOUS TISSUE SUBSTITUTE, POSTERIOR APPROACH, POSTERIOR COLUMN, OPEN APPROACH: ICD-10-PCS | Performed by: NEUROLOGICAL SURGERY

## 2025-01-27 PROCEDURE — 0ST40ZZ RESECTION OF LUMBOSACRAL DISC, OPEN APPROACH: ICD-10-PCS | Performed by: NEUROLOGICAL SURGERY

## 2025-01-27 PROCEDURE — 0ST20ZZ RESECTION OF LUMBAR VERTEBRAL DISC, OPEN APPROACH: ICD-10-PCS | Performed by: NEUROLOGICAL SURGERY

## 2025-01-27 PROCEDURE — 8E0WXBG COMPUTER ASSISTED PROCEDURE OF TRUNK REGION, WITH COMPUTERIZED TOMOGRAPHY: ICD-10-PCS | Performed by: NEUROLOGICAL SURGERY

## 2025-01-27 PROCEDURE — 0SP004Z REMOVAL OF INTERNAL FIXATION DEVICE FROM LUMBAR VERTEBRAL JOINT, OPEN APPROACH: ICD-10-PCS | Performed by: NEUROLOGICAL SURGERY

## 2025-01-27 PROCEDURE — 22842 INSERT SPINE FIXATION DEVICE: CPT | Performed by: NEUROLOGICAL SURGERY

## 2025-01-27 PROCEDURE — C1763 CONN TISS, NON-HUMAN: HCPCS | Performed by: NEUROLOGICAL SURGERY

## 2025-01-27 PROCEDURE — 61783 SCAN PROC SPINAL: CPT | Performed by: NEUROLOGICAL SURGERY

## 2025-01-27 PROCEDURE — 2720000007 HC OR 272 NO HCPCS: Performed by: NEUROLOGICAL SURGERY

## 2025-01-27 PROCEDURE — 99100 ANES PT EXTEME AGE<1 YR&>70: CPT | Performed by: ANESTHESIOLOGY

## 2025-01-27 PROCEDURE — 2780000003 HC OR 278 NO HCPCS: Performed by: NEUROLOGICAL SURGERY

## 2025-01-27 PROCEDURE — 22585 ARTHRD ANT NTRBD MIN DSC EA: CPT | Performed by: NEUROLOGICAL SURGERY

## 2025-01-27 PROCEDURE — 22558 ARTHRD ANT NTRBD MIN DSC LUM: CPT | Performed by: NEUROLOGICAL SURGERY

## 2025-01-27 PROCEDURE — 2500000002 HC RX 250 W HCPCS SELF ADMINISTERED DRUGS (ALT 637 FOR MEDICARE OP, ALT 636 FOR OP/ED): Performed by: NEUROLOGICAL SURGERY

## 2025-01-27 PROCEDURE — 0SG1071 FUSION OF 2 OR MORE LUMBAR VERTEBRAL JOINTS WITH AUTOLOGOUS TISSUE SUBSTITUTE, POSTERIOR APPROACH, POSTERIOR COLUMN, OPEN APPROACH: ICD-10-PCS | Performed by: NEUROLOGICAL SURGERY

## 2025-01-27 PROCEDURE — 2500000005 HC RX 250 GENERAL PHARMACY W/O HCPCS: Performed by: ANESTHESIOLOGY

## 2025-01-27 PROCEDURE — C1889 IMPLANT/INSERT DEVICE, NOC: HCPCS | Performed by: NEUROLOGICAL SURGERY

## 2025-01-27 DEVICE — ALLOGRAFT, MAGNUS 5.5CC: Type: IMPLANTABLE DEVICE | Site: BACK | Status: FUNCTIONAL

## 2025-01-27 DEVICE — IMPLANTABLE DEVICE: Type: IMPLANTABLE DEVICE | Site: BACK | Status: FUNCTIONAL

## 2025-01-27 DEVICE — COMPONENT, SIGNIFY, BIOACTIVE CRUNCH, 10CC: Type: IMPLANTABLE DEVICE | Site: BACK | Status: FUNCTIONAL

## 2025-01-27 DEVICE — SPACER, MODULUS XLW, 10X22X55MM, 10 DEG: Type: IMPLANTABLE DEVICE | Site: BACK | Status: FUNCTIONAL

## 2025-01-27 DEVICE — COMPONENT, SIGNIFY, BIOACTIVE CRUNCH, 5CC: Type: IMPLANTABLE DEVICE | Site: BACK | Status: FUNCTIONAL

## 2025-01-27 RX ORDER — OXYCODONE HYDROCHLORIDE 5 MG/1
5 TABLET ORAL EVERY 4 HOURS PRN
Status: DISCONTINUED | OUTPATIENT
Start: 2025-01-27 | End: 2025-02-01 | Stop reason: HOSPADM

## 2025-01-27 RX ORDER — NALOXONE HYDROCHLORIDE 0.4 MG/ML
0.2 INJECTION, SOLUTION INTRAMUSCULAR; INTRAVENOUS; SUBCUTANEOUS EVERY 5 MIN PRN
Status: DISCONTINUED | OUTPATIENT
Start: 2025-01-27 | End: 2025-02-01 | Stop reason: HOSPADM

## 2025-01-27 RX ORDER — SODIUM CHLORIDE, SODIUM LACTATE, POTASSIUM CHLORIDE, CALCIUM CHLORIDE 600; 310; 30; 20 MG/100ML; MG/100ML; MG/100ML; MG/100ML
100 INJECTION, SOLUTION INTRAVENOUS CONTINUOUS
Status: DISCONTINUED | OUTPATIENT
Start: 2025-01-27 | End: 2025-01-27 | Stop reason: HOSPADM

## 2025-01-27 RX ORDER — FENTANYL CITRATE 50 UG/ML
INJECTION, SOLUTION INTRAMUSCULAR; INTRAVENOUS AS NEEDED
Status: DISCONTINUED | OUTPATIENT
Start: 2025-01-27 | End: 2025-01-27

## 2025-01-27 RX ORDER — PHENYLEPHRINE HCL IN 0.9% NACL 0.4MG/10ML
SYRINGE (ML) INTRAVENOUS AS NEEDED
Status: DISCONTINUED | OUTPATIENT
Start: 2025-01-27 | End: 2025-01-27

## 2025-01-27 RX ORDER — OXYCODONE HYDROCHLORIDE 5 MG/1
10 TABLET ORAL EVERY 4 HOURS PRN
Status: DISCONTINUED | OUTPATIENT
Start: 2025-01-27 | End: 2025-02-01 | Stop reason: HOSPADM

## 2025-01-27 RX ORDER — GLYCOPYRROLATE 0.2 MG/ML
INJECTION INTRAMUSCULAR; INTRAVENOUS AS NEEDED
Status: DISCONTINUED | OUTPATIENT
Start: 2025-01-27 | End: 2025-01-27

## 2025-01-27 RX ORDER — ENOXAPARIN SODIUM 100 MG/ML
40 INJECTION SUBCUTANEOUS EVERY 24 HOURS
Status: DISCONTINUED | OUTPATIENT
Start: 2025-01-28 | End: 2025-02-01 | Stop reason: HOSPADM

## 2025-01-27 RX ORDER — CYCLOBENZAPRINE HCL 10 MG
10 TABLET ORAL 3 TIMES DAILY PRN
Status: DISCONTINUED | OUTPATIENT
Start: 2025-01-27 | End: 2025-02-01 | Stop reason: HOSPADM

## 2025-01-27 RX ORDER — ONDANSETRON HYDROCHLORIDE 2 MG/ML
INJECTION, SOLUTION INTRAVENOUS AS NEEDED
Status: DISCONTINUED | OUTPATIENT
Start: 2025-01-27 | End: 2025-01-27

## 2025-01-27 RX ORDER — DEXTROSE 50 % IN WATER (D50W) INTRAVENOUS SYRINGE
25
Status: DISCONTINUED | OUTPATIENT
Start: 2025-01-27 | End: 2025-02-01 | Stop reason: HOSPADM

## 2025-01-27 RX ORDER — LIDOCAINE HYDROCHLORIDE 20 MG/ML
INJECTION, SOLUTION INFILTRATION; PERINEURAL AS NEEDED
Status: DISCONTINUED | OUTPATIENT
Start: 2025-01-27 | End: 2025-01-27

## 2025-01-27 RX ORDER — SODIUM CHLORIDE, SODIUM LACTATE, POTASSIUM CHLORIDE, CALCIUM CHLORIDE 600; 310; 30; 20 MG/100ML; MG/100ML; MG/100ML; MG/100ML
100 INJECTION, SOLUTION INTRAVENOUS CONTINUOUS
Status: ACTIVE | OUTPATIENT
Start: 2025-01-27 | End: 2025-01-28

## 2025-01-27 RX ORDER — CYCLOBENZAPRINE HCL 10 MG
5 TABLET ORAL 3 TIMES DAILY
Status: DISCONTINUED | OUTPATIENT
Start: 2025-01-27 | End: 2025-02-01 | Stop reason: HOSPADM

## 2025-01-27 RX ORDER — BUPIVACAINE HYDROCHLORIDE 2.5 MG/ML
INJECTION, SOLUTION INFILTRATION; PERINEURAL AS NEEDED
Status: DISCONTINUED | OUTPATIENT
Start: 2025-01-27 | End: 2025-01-27 | Stop reason: HOSPADM

## 2025-01-27 RX ORDER — POLYETHYLENE GLYCOL 3350 17 G/17G
17 POWDER, FOR SOLUTION ORAL 2 TIMES DAILY
Status: DISCONTINUED | OUTPATIENT
Start: 2025-01-27 | End: 2025-02-01 | Stop reason: HOSPADM

## 2025-01-27 RX ORDER — VANCOMYCIN HYDROCHLORIDE 1 G/20ML
INJECTION, POWDER, LYOPHILIZED, FOR SOLUTION INTRAVENOUS AS NEEDED
Status: DISCONTINUED | OUTPATIENT
Start: 2025-01-27 | End: 2025-01-27

## 2025-01-27 RX ORDER — OXYCODONE HYDROCHLORIDE 5 MG/1
10 TABLET ORAL ONCE
Status: COMPLETED | OUTPATIENT
Start: 2025-01-27 | End: 2025-01-27

## 2025-01-27 RX ORDER — LIDOCAINE HYDROCHLORIDE AND EPINEPHRINE 5; 5 MG/ML; UG/ML
INJECTION, SOLUTION INFILTRATION; PERINEURAL AS NEEDED
Status: DISCONTINUED | OUTPATIENT
Start: 2025-01-27 | End: 2025-01-27 | Stop reason: HOSPADM

## 2025-01-27 RX ORDER — ACETAMINOPHEN 500 MG
10 TABLET ORAL NIGHTLY
Status: DISCONTINUED | OUTPATIENT
Start: 2025-01-27 | End: 2025-02-01 | Stop reason: HOSPADM

## 2025-01-27 RX ORDER — PHENYLEPHRINE 10 MG/250 ML(40 MCG/ML)IN 0.9 % SOD.CHLORIDE INTRAVENOUS
CONTINUOUS PRN
Status: DISCONTINUED | OUTPATIENT
Start: 2025-01-27 | End: 2025-01-27

## 2025-01-27 RX ORDER — SODIUM CHLORIDE, SODIUM LACTATE, POTASSIUM CHLORIDE, CALCIUM CHLORIDE 600; 310; 30; 20 MG/100ML; MG/100ML; MG/100ML; MG/100ML
INJECTION, SOLUTION INTRAVENOUS CONTINUOUS PRN
Status: DISCONTINUED | OUTPATIENT
Start: 2025-01-27 | End: 2025-01-27

## 2025-01-27 RX ORDER — ACETAMINOPHEN 325 MG/1
650 TABLET ORAL EVERY 6 HOURS
Status: DISCONTINUED | OUTPATIENT
Start: 2025-01-27 | End: 2025-01-30

## 2025-01-27 RX ORDER — ONDANSETRON HYDROCHLORIDE 2 MG/ML
4 INJECTION, SOLUTION INTRAVENOUS ONCE AS NEEDED
Status: DISCONTINUED | OUTPATIENT
Start: 2025-01-27 | End: 2025-01-27 | Stop reason: HOSPADM

## 2025-01-27 RX ORDER — FENTANYL CITRATE 50 UG/ML
12.5 INJECTION, SOLUTION INTRAMUSCULAR; INTRAVENOUS EVERY 5 MIN PRN
Status: DISCONTINUED | OUTPATIENT
Start: 2025-01-27 | End: 2025-01-27 | Stop reason: HOSPADM

## 2025-01-27 RX ORDER — SUCCINYLCHOLINE CHLORIDE 20 MG/ML
INJECTION INTRAMUSCULAR; INTRAVENOUS AS NEEDED
Status: DISCONTINUED | OUTPATIENT
Start: 2025-01-27 | End: 2025-01-27

## 2025-01-27 RX ORDER — CLINDAMYCIN PHOSPHATE 600 MG/50ML
INJECTION, SOLUTION INTRAVENOUS AS NEEDED
Status: DISCONTINUED | OUTPATIENT
Start: 2025-01-27 | End: 2025-01-27

## 2025-01-27 RX ORDER — ONDANSETRON HYDROCHLORIDE 2 MG/ML
4 INJECTION, SOLUTION INTRAVENOUS EVERY 8 HOURS PRN
Status: DISCONTINUED | OUTPATIENT
Start: 2025-01-27 | End: 2025-02-01 | Stop reason: HOSPADM

## 2025-01-27 RX ORDER — DEXTROSE 50 % IN WATER (D50W) INTRAVENOUS SYRINGE
12.5
Status: DISCONTINUED | OUTPATIENT
Start: 2025-01-27 | End: 2025-02-01 | Stop reason: HOSPADM

## 2025-01-27 RX ORDER — PROPOFOL 10 MG/ML
INJECTION, EMULSION INTRAVENOUS AS NEEDED
Status: DISCONTINUED | OUTPATIENT
Start: 2025-01-27 | End: 2025-01-27

## 2025-01-27 RX ORDER — OXYCODONE HYDROCHLORIDE 5 MG/1
2.5 TABLET ORAL EVERY 4 HOURS PRN
Status: DISCONTINUED | OUTPATIENT
Start: 2025-01-27 | End: 2025-02-01 | Stop reason: HOSPADM

## 2025-01-27 RX ORDER — FENTANYL CITRATE 50 UG/ML
25 INJECTION, SOLUTION INTRAMUSCULAR; INTRAVENOUS EVERY 5 MIN PRN
Status: DISCONTINUED | OUTPATIENT
Start: 2025-01-27 | End: 2025-01-27 | Stop reason: HOSPADM

## 2025-01-27 RX ORDER — REMIFENTANIL HYDROCHLORIDE 1 MG/ML
INJECTION, POWDER, LYOPHILIZED, FOR SOLUTION INTRAVENOUS CONTINUOUS PRN
Status: DISCONTINUED | OUTPATIENT
Start: 2025-01-27 | End: 2025-01-27

## 2025-01-27 RX ORDER — BISACODYL 5 MG
10 TABLET, DELAYED RELEASE (ENTERIC COATED) ORAL DAILY PRN
Status: DISCONTINUED | OUTPATIENT
Start: 2025-01-27 | End: 2025-01-29

## 2025-01-27 RX ORDER — VANCOMYCIN HYDROCHLORIDE 1 G/20ML
INJECTION, POWDER, LYOPHILIZED, FOR SOLUTION INTRAVENOUS AS NEEDED
Status: DISCONTINUED | OUTPATIENT
Start: 2025-01-27 | End: 2025-01-27 | Stop reason: HOSPADM

## 2025-01-27 RX ORDER — DROPERIDOL 2.5 MG/ML
0.62 INJECTION, SOLUTION INTRAMUSCULAR; INTRAVENOUS ONCE AS NEEDED
Status: DISCONTINUED | OUTPATIENT
Start: 2025-01-27 | End: 2025-01-27 | Stop reason: HOSPADM

## 2025-01-27 RX ORDER — ZOLPIDEM TARTRATE 5 MG/1
5 TABLET ORAL NIGHTLY
Status: DISCONTINUED | OUTPATIENT
Start: 2025-01-27 | End: 2025-02-01 | Stop reason: HOSPADM

## 2025-01-27 RX ORDER — ONDANSETRON 4 MG/1
4 TABLET, FILM COATED ORAL EVERY 8 HOURS PRN
Status: DISCONTINUED | OUTPATIENT
Start: 2025-01-27 | End: 2025-02-01 | Stop reason: HOSPADM

## 2025-01-27 RX ORDER — LIDOCAINE HYDROCHLORIDE 10 MG/ML
0.1 INJECTION, SOLUTION INFILTRATION; PERINEURAL ONCE
Status: DISCONTINUED | OUTPATIENT
Start: 2025-01-27 | End: 2025-01-27 | Stop reason: HOSPADM

## 2025-01-27 RX ORDER — HYDROMORPHONE HYDROCHLORIDE 0.2 MG/ML
0.2 INJECTION INTRAMUSCULAR; INTRAVENOUS; SUBCUTANEOUS EVERY 4 HOURS PRN
Status: DISCONTINUED | OUTPATIENT
Start: 2025-01-27 | End: 2025-01-30

## 2025-01-27 RX ORDER — SODIUM CHLORIDE 9 MG/ML
INJECTION, SOLUTION INTRAVENOUS CONTINUOUS PRN
Status: DISCONTINUED | OUTPATIENT
Start: 2025-01-27 | End: 2025-01-27

## 2025-01-27 RX ORDER — HYDROMORPHONE HYDROCHLORIDE 1 MG/ML
INJECTION, SOLUTION INTRAMUSCULAR; INTRAVENOUS; SUBCUTANEOUS AS NEEDED
Status: DISCONTINUED | OUTPATIENT
Start: 2025-01-27 | End: 2025-01-27

## 2025-01-27 RX ORDER — FENTANYL CITRATE 50 UG/ML
50 INJECTION, SOLUTION INTRAMUSCULAR; INTRAVENOUS EVERY 5 MIN PRN
Status: DISCONTINUED | OUTPATIENT
Start: 2025-01-27 | End: 2025-01-27 | Stop reason: HOSPADM

## 2025-01-27 RX ORDER — TRANEXAMIC ACID 100 MG/ML
INJECTION, SOLUTION INTRAVENOUS AS NEEDED
Status: DISCONTINUED | OUTPATIENT
Start: 2025-01-27 | End: 2025-01-27

## 2025-01-27 RX ADMIN — ACETAMINOPHEN 650 MG: 325 TABLET, FILM COATED ORAL at 20:14

## 2025-01-27 RX ADMIN — SUCCINYLCHOLINE CHLORIDE 120 MG: 20 INJECTION, SOLUTION INTRAMUSCULAR; INTRAVENOUS at 07:57

## 2025-01-27 RX ADMIN — OXYCODONE 10 MG: 5 TABLET ORAL at 22:07

## 2025-01-27 RX ADMIN — SODIUM CHLORIDE, POTASSIUM CHLORIDE, SODIUM LACTATE AND CALCIUM CHLORIDE 100 ML/HR: 600; 310; 30; 20 INJECTION, SOLUTION INTRAVENOUS at 20:18

## 2025-01-27 RX ADMIN — FENTANYL CITRATE 100 MCG: 50 INJECTION, SOLUTION INTRAMUSCULAR; INTRAVENOUS at 07:56

## 2025-01-27 RX ADMIN — SODIUM CHLORIDE, POTASSIUM CHLORIDE, SODIUM LACTATE AND CALCIUM CHLORIDE: 600; 310; 30; 20 INJECTION, SOLUTION INTRAVENOUS at 07:44

## 2025-01-27 RX ADMIN — Medication 120 MCG: at 07:57

## 2025-01-27 RX ADMIN — ENOXAPARIN SODIUM 40 MG: 100 INJECTION SUBCUTANEOUS at 23:21

## 2025-01-27 RX ADMIN — FENTANYL CITRATE 50 MCG: 50 INJECTION INTRAMUSCULAR; INTRAVENOUS at 17:18

## 2025-01-27 RX ADMIN — TRANEXAMIC ACID 2000 MG: 100 INJECTION INTRAVENOUS at 08:21

## 2025-01-27 RX ADMIN — Medication 10 MG: at 22:07

## 2025-01-27 RX ADMIN — FENTANYL CITRATE 50 MCG: 50 INJECTION INTRAMUSCULAR; INTRAVENOUS at 17:26

## 2025-01-27 RX ADMIN — SODIUM CHLORIDE, POTASSIUM CHLORIDE, SODIUM LACTATE AND CALCIUM CHLORIDE: 600; 310; 30; 20 INJECTION, SOLUTION INTRAVENOUS at 13:46

## 2025-01-27 RX ADMIN — REMIFENTANIL HYDROCHLORIDE 0.07 MCG/KG/MIN: 1 INJECTION, POWDER, LYOPHILIZED, FOR SOLUTION INTRAVENOUS at 07:59

## 2025-01-27 RX ADMIN — HYDROMORPHONE HYDROCHLORIDE 0.2 MG: 1 INJECTION, SOLUTION INTRAMUSCULAR; INTRAVENOUS; SUBCUTANEOUS at 10:52

## 2025-01-27 RX ADMIN — TRANEXAMIC ACID 2 MG/KG/HR: 100 INJECTION INTRAVENOUS at 09:25

## 2025-01-27 RX ADMIN — Medication 120 MCG: at 08:07

## 2025-01-27 RX ADMIN — Medication 3 L/MIN: at 17:00

## 2025-01-27 RX ADMIN — POLYETHYLENE GLYCOL 3350 17 G: 17 POWDER, FOR SOLUTION ORAL at 20:16

## 2025-01-27 RX ADMIN — CLINDAMYCIN IN 5 PERCENT DEXTROSE 600 MG: 12 INJECTION, SOLUTION INTRAVENOUS at 14:41

## 2025-01-27 RX ADMIN — CYCLOBENZAPRINE 5 MG: 10 TABLET, FILM COATED ORAL at 20:14

## 2025-01-27 RX ADMIN — SODIUM CHLORIDE: 9 INJECTION, SOLUTION INTRAVENOUS at 08:39

## 2025-01-27 RX ADMIN — CLINDAMYCIN IN 5 PERCENT DEXTROSE 600 MG: 12 INJECTION, SOLUTION INTRAVENOUS at 08:41

## 2025-01-27 RX ADMIN — ZOLPIDEM TARTRATE 5 MG: 5 TABLET, FILM COATED ORAL at 22:07

## 2025-01-27 RX ADMIN — HYDROMORPHONE HYDROCHLORIDE 0.2 MG: 1 INJECTION, SOLUTION INTRAMUSCULAR; INTRAVENOUS; SUBCUTANEOUS at 11:02

## 2025-01-27 RX ADMIN — GLYCOPYRROLATE 0.1 MG: 0.2 INJECTION, SOLUTION INTRAMUSCULAR; INTRAVENOUS at 07:53

## 2025-01-27 RX ADMIN — Medication 40 MCG: at 08:08

## 2025-01-27 RX ADMIN — Medication 80 MCG: at 08:05

## 2025-01-27 RX ADMIN — PHENYLEPHRINE-NACL IV SOLUTION 10 MG/250ML-0.9% 0.4 MCG/KG/MIN: 10-0.9/25 SOLUTION at 08:06

## 2025-01-27 RX ADMIN — LIDOCAINE HYDROCHLORIDE 100 MG: 20 INJECTION, SOLUTION INFILTRATION; PERINEURAL at 07:56

## 2025-01-27 RX ADMIN — HYDROMORPHONE HYDROCHLORIDE 0.2 MG: 1 INJECTION, SOLUTION INTRAMUSCULAR; INTRAVENOUS; SUBCUTANEOUS at 12:02

## 2025-01-27 RX ADMIN — POVIDONE-IODINE 1 APPLICATION: 5 SOLUTION TOPICAL at 06:25

## 2025-01-27 RX ADMIN — FENTANYL CITRATE 50 MCG: 50 INJECTION INTRAMUSCULAR; INTRAVENOUS at 17:41

## 2025-01-27 RX ADMIN — PROPOFOL 100 MG: 10 INJECTION, EMULSION INTRAVENOUS at 07:57

## 2025-01-27 RX ADMIN — HYDROMORPHONE HYDROCHLORIDE 0.2 MG: 1 INJECTION, SOLUTION INTRAMUSCULAR; INTRAVENOUS; SUBCUTANEOUS at 16:26

## 2025-01-27 RX ADMIN — HYDROMORPHONE HYDROCHLORIDE 0.2 MG: 1 INJECTION, SOLUTION INTRAMUSCULAR; INTRAVENOUS; SUBCUTANEOUS at 13:50

## 2025-01-27 RX ADMIN — PROPOFOL 100 MG: 10 INJECTION, EMULSION INTRAVENOUS at 07:56

## 2025-01-27 RX ADMIN — ONDANSETRON 4 MG: 2 INJECTION INTRAMUSCULAR; INTRAVENOUS at 16:14

## 2025-01-27 RX ADMIN — DEXAMETHASONE SODIUM PHOSPHATE 4 MG: 4 INJECTION INTRA-ARTICULAR; INTRALESIONAL; INTRAMUSCULAR; INTRAVENOUS; SOFT TISSUE at 08:33

## 2025-01-27 RX ADMIN — VANCOMYCIN HYDROCHLORIDE 1 G: 1025 INJECTION, POWDER, FOR SOLUTION INTRAVENOUS; ORAL at 08:01

## 2025-01-27 RX ADMIN — HYDROMORPHONE HYDROCHLORIDE 0.5 MG: 1 INJECTION, SOLUTION INTRAMUSCULAR; INTRAVENOUS; SUBCUTANEOUS at 17:08

## 2025-01-27 RX ADMIN — OXYCODONE 10 MG: 5 TABLET ORAL at 18:00

## 2025-01-27 SDOH — HEALTH STABILITY: MENTAL HEALTH: HOW OFTEN DO YOU HAVE SIX OR MORE DRINKS ON ONE OCCASION?: NEVER

## 2025-01-27 SDOH — ECONOMIC STABILITY: FOOD INSECURITY: WITHIN THE PAST 12 MONTHS, THE FOOD YOU BOUGHT JUST DIDN'T LAST AND YOU DIDN'T HAVE MONEY TO GET MORE.: NEVER TRUE

## 2025-01-27 SDOH — ECONOMIC STABILITY: HOUSING INSECURITY: IN THE LAST 12 MONTHS, WAS THERE A TIME WHEN YOU WERE NOT ABLE TO PAY THE MORTGAGE OR RENT ON TIME?: NO

## 2025-01-27 SDOH — HEALTH STABILITY: MENTAL HEALTH: HOW OFTEN DO YOU HAVE A DRINK CONTAINING ALCOHOL?: NEVER

## 2025-01-27 SDOH — HEALTH STABILITY: MENTAL HEALTH: CURRENT SMOKER: 0

## 2025-01-27 SDOH — SOCIAL STABILITY: SOCIAL INSECURITY: WERE YOU ABLE TO COMPLETE ALL THE BEHAVIORAL HEALTH SCREENINGS?: YES

## 2025-01-27 SDOH — ECONOMIC STABILITY: HOUSING INSECURITY: AT ANY TIME IN THE PAST 12 MONTHS, WERE YOU HOMELESS OR LIVING IN A SHELTER (INCLUDING NOW)?: NO

## 2025-01-27 SDOH — SOCIAL STABILITY: SOCIAL INSECURITY: DO YOU FEEL ANYONE HAS EXPLOITED OR TAKEN ADVANTAGE OF YOU FINANCIALLY OR OF YOUR PERSONAL PROPERTY?: NO

## 2025-01-27 SDOH — SOCIAL STABILITY: SOCIAL INSECURITY: ARE YOU OR HAVE YOU BEEN THREATENED OR ABUSED PHYSICALLY, EMOTIONALLY, OR SEXUALLY BY ANYONE?: NO

## 2025-01-27 SDOH — ECONOMIC STABILITY: TRANSPORTATION INSECURITY: IN THE PAST 12 MONTHS, HAS LACK OF TRANSPORTATION KEPT YOU FROM MEDICAL APPOINTMENTS OR FROM GETTING MEDICATIONS?: NO

## 2025-01-27 SDOH — SOCIAL STABILITY: SOCIAL INSECURITY: ABUSE: ADULT

## 2025-01-27 SDOH — SOCIAL STABILITY: SOCIAL INSECURITY
WITHIN THE LAST YEAR, HAVE YOU BEEN RAPED OR FORCED TO HAVE ANY KIND OF SEXUAL ACTIVITY BY YOUR PARTNER OR EX-PARTNER?: NO

## 2025-01-27 SDOH — ECONOMIC STABILITY: FOOD INSECURITY: HOW HARD IS IT FOR YOU TO PAY FOR THE VERY BASICS LIKE FOOD, HOUSING, MEDICAL CARE, AND HEATING?: NOT VERY HARD

## 2025-01-27 SDOH — HEALTH STABILITY: MENTAL HEALTH: HOW MANY DRINKS CONTAINING ALCOHOL DO YOU HAVE ON A TYPICAL DAY WHEN YOU ARE DRINKING?: PATIENT DOES NOT DRINK

## 2025-01-27 SDOH — SOCIAL STABILITY: SOCIAL INSECURITY: DO YOU FEEL UNSAFE GOING BACK TO THE PLACE WHERE YOU ARE LIVING?: NO

## 2025-01-27 SDOH — SOCIAL STABILITY: SOCIAL INSECURITY: WITHIN THE LAST YEAR, HAVE YOU BEEN AFRAID OF YOUR PARTNER OR EX-PARTNER?: NO

## 2025-01-27 SDOH — SOCIAL STABILITY: SOCIAL INSECURITY
WITHIN THE LAST YEAR, HAVE YOU BEEN KICKED, HIT, SLAPPED, OR OTHERWISE PHYSICALLY HURT BY YOUR PARTNER OR EX-PARTNER?: NO

## 2025-01-27 SDOH — ECONOMIC STABILITY: FOOD INSECURITY: WITHIN THE PAST 12 MONTHS, YOU WORRIED THAT YOUR FOOD WOULD RUN OUT BEFORE YOU GOT THE MONEY TO BUY MORE.: NEVER TRUE

## 2025-01-27 SDOH — SOCIAL STABILITY: SOCIAL INSECURITY: DOES ANYONE TRY TO KEEP YOU FROM HAVING/CONTACTING OTHER FRIENDS OR DOING THINGS OUTSIDE YOUR HOME?: NO

## 2025-01-27 SDOH — SOCIAL STABILITY: SOCIAL INSECURITY: WITHIN THE LAST YEAR, HAVE YOU BEEN HUMILIATED OR EMOTIONALLY ABUSED IN OTHER WAYS BY YOUR PARTNER OR EX-PARTNER?: NO

## 2025-01-27 SDOH — SOCIAL STABILITY: SOCIAL INSECURITY: HAVE YOU HAD THOUGHTS OF HARMING ANYONE ELSE?: NO

## 2025-01-27 SDOH — ECONOMIC STABILITY: INCOME INSECURITY: IN THE PAST 12 MONTHS HAS THE ELECTRIC, GAS, OIL, OR WATER COMPANY THREATENED TO SHUT OFF SERVICES IN YOUR HOME?: NO

## 2025-01-27 SDOH — SOCIAL STABILITY: SOCIAL INSECURITY: HAS ANYONE EVER THREATENED TO HURT YOUR FAMILY OR YOUR PETS?: NO

## 2025-01-27 SDOH — SOCIAL STABILITY: SOCIAL INSECURITY: ARE THERE ANY APPARENT SIGNS OF INJURIES/BEHAVIORS THAT COULD BE RELATED TO ABUSE/NEGLECT?: NO

## 2025-01-27 SDOH — ECONOMIC STABILITY: HOUSING INSECURITY: IN THE PAST 12 MONTHS, HOW MANY TIMES HAVE YOU MOVED WHERE YOU WERE LIVING?: 0

## 2025-01-27 ASSESSMENT — COGNITIVE AND FUNCTIONAL STATUS - GENERAL
TURNING FROM BACK TO SIDE WHILE IN FLAT BAD: A LOT
MOBILITY SCORE: 14
TURNING FROM BACK TO SIDE WHILE IN FLAT BAD: A LOT
DAILY ACTIVITIY SCORE: 16
MOVING FROM LYING ON BACK TO SITTING ON SIDE OF FLAT BED WITH BEDRAILS: A LOT
PERSONAL GROOMING: A LITTLE
MOVING TO AND FROM BED TO CHAIR: A LOT
HELP NEEDED FOR BATHING: A LOT
DRESSING REGULAR UPPER BODY CLOTHING: A LOT
DRESSING REGULAR LOWER BODY CLOTHING: A LOT
TOILETING: A LITTLE
PERSONAL GROOMING: A LITTLE
WALKING IN HOSPITAL ROOM: A LITTLE
TOILETING: A LITTLE
DRESSING REGULAR UPPER BODY CLOTHING: A LOT
MOVING TO AND FROM BED TO CHAIR: A LOT
MOVING FROM LYING ON BACK TO SITTING ON SIDE OF FLAT BED WITH BEDRAILS: A LOT
STANDING UP FROM CHAIR USING ARMS: A LITTLE
DAILY ACTIVITIY SCORE: 16
STANDING UP FROM CHAIR USING ARMS: A LITTLE
HELP NEEDED FOR BATHING: A LOT
PATIENT BASELINE BEDBOUND: NO
WALKING IN HOSPITAL ROOM: A LITTLE
DRESSING REGULAR LOWER BODY CLOTHING: A LOT
MOBILITY SCORE: 14
CLIMB 3 TO 5 STEPS WITH RAILING: A LOT
CLIMB 3 TO 5 STEPS WITH RAILING: A LOT

## 2025-01-27 ASSESSMENT — ACTIVITIES OF DAILY LIVING (ADL)
LACK_OF_TRANSPORTATION: NO
GROOMING: NEEDS ASSISTANCE
HEARING - LEFT EAR: FUNCTIONAL
TOILETING: NEEDS ASSISTANCE
BATHING: NEEDS ASSISTANCE
FEEDING YOURSELF: INDEPENDENT
ASSISTIVE_DEVICE: WALKER
LACK_OF_TRANSPORTATION: NO
LACK_OF_TRANSPORTATION: NO
DRESSING YOURSELF: NEEDS ASSISTANCE
PATIENT'S MEMORY ADEQUATE TO SAFELY COMPLETE DAILY ACTIVITIES?: YES
HEARING - RIGHT EAR: FUNCTIONAL
JUDGMENT_ADEQUATE_SAFELY_COMPLETE_DAILY_ACTIVITIES: YES
ADEQUATE_TO_COMPLETE_ADL: YES
WALKS IN HOME: NEEDS ASSISTANCE

## 2025-01-27 ASSESSMENT — PAIN SCALES - GENERAL
PAIN_LEVEL: 4
PAINLEVEL_OUTOF10: 8
PAINLEVEL_OUTOF10: 9
PAINLEVEL_OUTOF10: 7
PAINLEVEL_OUTOF10: 8
PAINLEVEL_OUTOF10: 8
PAINLEVEL_OUTOF10: 0 - NO PAIN
PAINLEVEL_OUTOF10: 8

## 2025-01-27 ASSESSMENT — LIFESTYLE VARIABLES
AUDIT-C TOTAL SCORE: 0
SKIP TO QUESTIONS 9-10: 1
HOW OFTEN DO YOU HAVE 6 OR MORE DRINKS ON ONE OCCASION: NEVER
SKIP TO QUESTIONS 9-10: 1
AUDIT-C TOTAL SCORE: 0
AUDIT-C TOTAL SCORE: 0
HOW MANY STANDARD DRINKS CONTAINING ALCOHOL DO YOU HAVE ON A TYPICAL DAY: PATIENT DOES NOT DRINK
HOW OFTEN DO YOU HAVE A DRINK CONTAINING ALCOHOL: NEVER

## 2025-01-27 ASSESSMENT — PAIN DESCRIPTION - LOCATION
LOCATION: BACK

## 2025-01-27 ASSESSMENT — PAIN - FUNCTIONAL ASSESSMENT
PAIN_FUNCTIONAL_ASSESSMENT: 0-10
PAIN_FUNCTIONAL_ASSESSMENT: UNABLE TO SELF-REPORT
PAIN_FUNCTIONAL_ASSESSMENT: 0-10

## 2025-01-27 ASSESSMENT — PAIN DESCRIPTION - ORIENTATION
ORIENTATION: MID
ORIENTATION: MID

## 2025-01-27 ASSESSMENT — COLUMBIA-SUICIDE SEVERITY RATING SCALE - C-SSRS
6. HAVE YOU EVER DONE ANYTHING, STARTED TO DO ANYTHING, OR PREPARED TO DO ANYTHING TO END YOUR LIFE?: NO
2. HAVE YOU ACTUALLY HAD ANY THOUGHTS OF KILLING YOURSELF?: NO
1. IN THE PAST MONTH, HAVE YOU WISHED YOU WERE DEAD OR WISHED YOU COULD GO TO SLEEP AND NOT WAKE UP?: NO

## 2025-01-27 ASSESSMENT — PAIN SCALES - PAIN ASSESSMENT IN ADVANCED DEMENTIA (PAINAD): TOTALSCORE: MEDICATION (SEE MAR)

## 2025-01-27 NOTE — ANESTHESIA POSTPROCEDURE EVALUATION
Patient: Ernie Flores    Procedure Summary       Date: 01/27/25 Room / Location: [facility] OR 23 / Virtual [facility] OR    Anesthesia Start: 0744 Anesthesia Stop: 1708    Procedure: L3-4 annd L4-5 Spine Lumbar Fusion Extreme Lateral XLIF; L3-S1 revision/extension posterior fusion with L5-S1 transforaminal lumbar interbody fusion (Back) Diagnosis:       Bilateral lumbar radiculopathy      Spondylolisthesis of lumbar region      Lumbar pseudoarthrosis      (Bilateral lumbar radiculopathy [M54.16])      (Spondylolisthesis of lumbar region [M43.16])      (Lumbar pseudoarthrosis [S32.009K])    Surgeons: Geraldo Clark MD PhD Responsible Provider: Waqas Gay MD    Anesthesia Type: general ASA Status: 3            Anesthesia Type: general    Vitals Value Taken Time   /58 01/27/25 1704   Temp 36.2 01/27/25 1713   Pulse 99 01/27/25 1708   Resp 17 01/27/25 1708   SpO2 91 % 01/27/25 1708   Vitals shown include unfiled device data.    Anesthesia Post Evaluation    Patient location during evaluation: floor  Patient participation: complete - patient participated  Level of consciousness: awake  Pain score: 4  Pain management: adequate  Multimodal analgesia pain management approach  Airway patency: patent  Two or more strategies used to mitigate risk of obstructive sleep apnea  Cardiovascular status: acceptable  Respiratory status: acceptable, airway suctioned and face mask  Hydration status: acceptable  Postoperative Nausea and Vomiting: mild    No notable events documented.

## 2025-01-27 NOTE — ANESTHESIA PROCEDURE NOTES
Arterial Line:    Date/Time: 1/27/2025 8:10 AM    Staffing  Performed: MELISSA   Authorized by: Vel Turner MD    Performed by: MICHELLE Cerda Capp    An arterial line was placed. in the OR for the following indication(s): continuous blood pressure monitoring and blood sampling needed.    A 20 gauge (size), 1 and 3/4 inch (length), Angiocath (type) catheter was placed into the Left radial artery, secured by Tegaderm,   Seldinger technique used.  Events:  patient tolerated procedure well with no complications.      Additional notes:  Sterile prep and glove. One attempt. Sterile dressing placed.

## 2025-01-27 NOTE — ANESTHESIA PROCEDURE NOTES
Airway  Date/Time: 1/27/2025 8:00 AM  Urgency: elective    Airway not difficult    Staffing  Performed: MELISSA   Authorized by: Vel Turner MD    Performed by: MICHELLE Cerda Capp  Patient location during procedure: OR    Indications and Patient Condition  Indications for airway management: airway protection and anesthesia  Spontaneous Ventilation: absent  Sedation level: deep  Preoxygenated: yes  Mask difficulty assessment: 1 - vent by mask    Final Airway Details  Final airway type: endotracheal airway      Successful airway: ETT  Cuffed: yes   Successful intubation technique: video laryngoscopy  Facilitating devices/methods: intubating stylet  Endotracheal tube insertion site: oral  Blade size: #4  ETT size (mm): 7.5  Cormack-Lehane Classification: grade I - full view of glottis  Placement verified by: chest auscultation and capnometry   Measured from: gums  ETT to gums (cm): 22  Number of attempts at approach: 1  Number of other approaches attempted: 0    Additional Comments  Atraumatic Intubation. Lips and teeth in preanesthetic condition.

## 2025-01-27 NOTE — HOSPITAL COURSE
Ernie Flores is a 74 y.o. male with a past medical history of BPH, cataracts, insomnia, IBS and cauda equina syndrome s/p L4-S1 decompression and fusion in 2022. He presented to clinic with ASD and was then admitted to Neurosurgery for surgical management.    1/27 s/p L3/4, L4/5 prone XLIF, L5/S1 TLIF, L3-S1 extension/revision of fusion.   1/29 Drain removed.     PT/OT evaluated patient and recommended acute rehab placement at discharge.     On the day of discharge, the patient was seen and evaluated by the neurosurgery team and deemed suitable for discharge. The patient was given detailed discharge instructions and were scheduled to follow up as an outpatient.

## 2025-01-27 NOTE — ANESTHESIA PROCEDURE NOTES
Peripheral IV  Date/Time: 1/27/2025 8:21 AM  Inserted by: MICHELLE Cerda Capp    Placement  Needle size: 18 G  Laterality: left  Location: forearm  Local anesthetic: none  Site prep: alcohol  Attempts: 2

## 2025-01-27 NOTE — ANESTHESIA PROCEDURE NOTES
Peripheral IV  Date/Time: 1/27/2025 8:09 AM  Inserted by: Vel Turner MD    Placement  Needle size: 16 G  Laterality: right  Location: hand  Local anesthetic: none  Site prep: alcohol  Attempts: 1

## 2025-01-27 NOTE — OP NOTE
L3-4 & L4-5 XLIF; L5-S1 TLIF; L3-S1 instrumented posterolateral fusionL3-S1 Operative Note     Date: 2025  OR Location: [place] OR    Name: Ernie Flores, : 1950, Age: 74 y.o., MRN: [id number], Sex: male    Diagnosis  Pre-op Diagnosis      * Bilateral lumbar radiculopathy [M54.16]     * Spondylolisthesis of lumbar region [M43.16]     * Lumbar pseudoarthrosis [S32.009K] Post-op Diagnosis     * Bilateral lumbar radiculopathy [M54.16]     * Spondylolisthesis of lumbar region [M43.16]     * Lumbar pseudoarthrosis [S32.009K]     Procedures  removal of prior posterior instrumentation; L3-L4 & L4-L5 lateral trans-psoas lumbar interbody fusion; L5-S1 posterior column osteotomy; L5-S1 transforaminal interbody fusion; L3-S1 instrumented posterolateral fusion; use of autograft and allograft for fusion; use of spinal stereotactic navigation     Surgeons      * Geraldo Clark - Primary    Resident/Fellow/Other Assistant:  Surgeons and Role:     * Chase Antunez MD - Resident - Assisting    Staff:   Circulator: Char  Scrub Person: Leda Godfrey Scrub: Jurgen Godfrey Circulator: Elizabeth Godfrey Scrub: Earnestine  Scrub Person: Esdras    Anesthesia Staff: Anesthesiologist: Florinda Chong MD MPH; Vel Turner MD; Waqas Gay MD  CRNA: ONEYDA Null  C-AA: MICHELLE Cerda Capp; MICHELLE Zhao; MICHELLE Banda  MELISSA: Kami Monte  Frontline Breaker: ONEYDA Knox    Procedure Summary  Anesthesia: General  ASA: III  Estimated Blood Loss: 400 mL  Intra-op Medications:   Administrations occurring from 0700 to 1505 on 25:   Medication Name Total Dose   lidocaine-epinephrine (Xylocaine W/EPI) 0.5 %-1:200,000 injection 10 mL   thrombin-recombinant (Recothrom) 5,000 unit topical solution 10,000 Units   polymyxin B 500,000 Units in sodium chloride 0.9% 1,000 mL irrigation 500,000 Units   clindamycin (Cleocin) IVPB 600 mg/50 mL D5 (premix) 1,200 mg   dexAMETHasone (Decadron)  injection 4 mg/mL 4 mg   fentaNYL (Sublimaze) injection 50 mcg/mL 100 mcg   glycopyrrolate (Robinul) injection 0.1 mg   HYDROmorphone (Dilaudid) injection 1 mg/mL 0.8 mg   lactated Ringer's infusion Cannot be calculated   lidocaine (Xylocaine) injection 2 % 100 mg   phenylephrine (Missael-Synephrine) 10 mg in sodium chloride 0.9% 250 mL (0.04 mg/mL) infusion (premix) 15.42 mg   phenylephrine 40 mcg/mL syringe 10 mL 360 mcg   propofol (Diprivan) injection 10 mg/mL 200 mg   remifentanil (Ultiva) 1,000 mcg in sodium chloride 0.9% 50 mL (20 mcg/mL) infusion 2.92 mg   sodium chloride 0.9 % infusion 96.5 mL   succinylcholine (Anectine) 20 mg/mL injection 120 mg   tranexamic acid (Cyklokapron) 6,250 mg in sodium chloride 0.9% 312.5 mL (20 mg/mL) infusion 1,111.8 mg   tranexamic acid injection 100 mg/mL 2,000 mg   vancomycin 1 g 1 g              Anesthesia Record               Intraprocedure I/O Totals          Intake    Remifentanil Drip 0.00 mL    The total shown is the total volume documented since Anesthesia Start was filed.    Tranexamic Acid 0.00 mL    The total shown is the total volume documented since Anesthesia Start was filed.    Phenylephrine Drip 0.00 mL    The total shown is the total volume documented since Anesthesia Start was filed.    lactated Ringer's 2500.00 mL    sodium chloride 0.9% 250.00 mL    Total Intake 2750 mL       Output    Urine 755 mL    Est. Blood Loss 400 mL    Total Output 1155 mL       Net    Net Volume 1595 mL          Specimen: No specimens collected     Drains and/or Catheters:     Implants:  Implants       Type Name Action Serial No.      Graft ALLOGRAFT, GERMAN 5.5CC - [id number] Implanted      Graft COMPONENT, SIGNIFY, BIOACTIVE CRUNCH, 10CC - [id number] Implanted       UNID EXP COCR CAMRYN 5.5MM 1-3 LV Implanted       OSSIFUSE FLOWABLE FIBER BONE GRAFT PUTTY, 5CC Implanted [id number]      SIGNIFY GEL INSTAFILL CARTRIDGE 5CC Implanted      Spinal Hardware SPACER, MODULUS XLW, [id number],  10 DEG - [id number] Implanted      Spinal Hardware SPACER, MODULUS XL, [id number], 10 DEG - [id number] Implanted      Screw 7.5 x 50mm screw Implanted      Screw 7.5 X 45 SCREW Implanted      Screw 8.5 X 50 SCREW Implanted      Spinal Hardware CAP, THREADED LOCKING, CREO - [id number] Implanted      Cage SABLE SPACER, 12X26 7-14MM 15 DEGREE Implanted       OSSIFUSE FLOWABLE FIBER BONE GRAFT PUTTY 10CC Implanted [id number]     Graft COMPONENT, SIGNIFY, BIOACTIVE CRUNCH, 5CC - [id number] Implanted               Findings: good placement of hardware and reduction of spondylolisthesis    Indications: Ernie Flores is an 74 y.o. male who is having surgery for Bilateral lumbar radiculopathy [M54.16]  Spondylolisthesis of lumbar region [M43.16]  Lumbar pseudoarthrosis [S32.009K].     The patient was seen in the preoperative area. The risks, benefits, complications, treatment options, non-operative alternatives, expected recovery and outcomes were discussed with the patient. The possibilities of reaction to medication, pulmonary aspiration, injury to surrounding structures, bleeding, recurrent infection, the need for additional procedures, failure to diagnose a condition, and creating a complication requiring transfusion or operation were discussed with the patient. The patient concurred with the proposed plan, giving informed consent.  The site of surgery was properly noted/marked if necessary per policy. The patient has been actively warmed in preoperative area. Preoperative antibiotics have been ordered and given within 1 hours of incision. Venous thrombosis prophylaxis have been ordered including bilateral sequential compression devices    DESCRIPTION OF THE OPERATION:   The patient was brought to the operating room theater. A verbal huddle was performed confirming the patient by name, date of birth, medical record number, site of surgery. After all team members were in agreement, they underwent anesthesia induction  without complication. Two large bore IVs were placed as well as endotracheal tube. Perioperative antibiotic administration was confirmed. The patient was placed prone on a regular raymond table with hip and chest bolsters to help elevate the left flank. Baseline TMAPs were obtained and EMG was confirmed to be responding appropriately.    Using lateral and AP fluoroscopy we confirmed our levels of interest and marked out a lateral incision between the L3/4 and L4/5 disc spaces. The posterior incision from his prior incision was also marked. Both of these areas were then prepped and draped in the usual sterile fashion. The skin for the posterior incision was then infiltrated with local anesthetic and we then began the procedure by opening the skin with a 10 blade and using combination of Bovie electrocautery and blunt dissection we exposed the spinous process, pars, lamina, joints, and prior hardware. Prior hardware was then removed without issue. Pseudoarthrosis was noted across the L4/5 fusion with loosened screws at L4 bilaterally. Next we turned our attention the instrumentation portion of the procedure. A clamp was attached to the spinous process at the rostral edge of the incision and the 3-D c arm was brought into the field. An intraoperative CT scan was performed and merged the patient in three dimensional space. We then use navigated drill and taps to cannulate the pedicles at L3 with a pedicle finder. We then placed 7.5 x 50 mm screws at L3, L4, L5, and 7.5 x 50 mm screw on the left S1, 8.5 x 50 mm screw on the right. This cavity was filled with irricept and then covered with a towel.     Next we turned our attention to the lateral interbody placement. The skin was then infiltrated with lidocaine with epinephrine and and opened using a 15 blade.  Blunt dissection and electrocautery were used to expose the oblique muscles and metzenbaum scissors were used to bluntly dissect through the obliques into the  retroperitoneal space. Direct palpation was used to identify the psoas muscle and the initial docking dilator was attached to free running EMG and directly docked onto the L3/4 disc space where EMG readings were acceptable. AP and lateral fluoroscopy was used to accurately dock the dilator onto the posterior 1/2 of the disc space at L3/4. Free running EMG was used to accurate dock without compression of the nerves within the psoas. Next a k wire was inserted with lateral fluoro into the disc space and sequential dilators and free running EMG was used to safely dilate the psoas muscle.    After dilation was performed the self retaining lateral retractor was inserted over the dilator with patient depth custom blades and placed onto the disc space of interest. The retractor was then secured to the bed and an EMG probe was then used to investigate all quadrants to look for any nerve tissue near the retractor. Next the retractor was opened and light sources attached exposing the anterior 1/2 of the disc space. Next we performed a discectomy by first performing an annulotomy and a combination of freddie, curettes, pituitary rongeurs to perform the discectomy. This allowed us to then achieve segmental lordosis and correction of deformity by removal of the disc and the end plates were prepped for fusion. Sequential distractors were used to 10 mm in height. Then using AP and lateral fluoroscopy we inserted a 3-D printed modulus cage into the disc space measuring 55 mm in length, 10 mm height, and 22 mm in width. The interbody graft was then released from the  and the retractor was then relaxed. Total retractor time through the psoas was <30 minutes. We then removed the retractor after achieving hemostasis with floseal and bipolar electrocautery.     Next we performed the same technique at L4/5, dilated the muscle with neuromonitoring, docked onto the disc space, excised the L4/5 disc, placed an 18 mm in width, 55 mm in  length, 10 mm height cage into the disc space after preparing it for fusion and removing the disc material.     We then copiously irrigated the incisions and approximated the obliques with vicryl suture and then 3-0 stratafix followed by glue was used to close the skin.     Attention was then turned back to the posterior incision. We identified the disc space at L5-S1 and performed a total right L5-S1 facetectomy. After identifying the disc space, we then used an annulotomy knife and then a combination of julia, pituitaries and kerrison rongeurs to perform a total discectomy at L5/S1. We then packed the disc space with autograft and allograft and place an expandable 7 mm Sable cage into the disc space and expanded it fully.     We then completed our segmental instrumentation with rods and locking caps were placed through our screw tops and final tightened to  specifications. We reduced the L4 screws which reduced both the L3/4 and L4/5 spondylolistheses. We then performed posterolateral arthrodesis decorticating the remaining joints and transverse processes from L3-S1. We then packed autologous bone graft into the gutters for fusion. Final AP + Lateral x-rays confirmed accurate placement of all hardware as well.     We then copiously irrigated our incision, 1g vanco powder was placed subfascially, drain was placed to suction and a multilayer closure was performed with 0 vicryl suture for the muscle and fascia, and 2-0 vicryl suture for the dermis. Startafix followed by mesh glue was used to close the skin. A silk stitch was used to secure the drain. Patient was then flipped supine onto a hospital bed and extubated and returned to PACU in stable condition.     Complications:  None; patient tolerated the procedure well.    Disposition: PACU - hemodynamically stable.  Condition: stable     Additional Details: Globus/Nuvasive instrumentation    Attending Attestation: I was present and scrubbed for the key  portions of the procedure.      Geraldo Clark  Phone Number: [phone]

## 2025-01-27 NOTE — ANESTHESIA PREPROCEDURE EVALUATION
Patient: Ernie Flores    Procedure Information       Date/Time: 01/27/25 0700    Procedure: L3-4 Spine Lumbar Fusion Extreme Lateral XLIF; L3-S1 revision/extension posterior fusion with L4-5 and L5-S1 transforaminal lumbar interbody fusion - DOCTOR REQUESTS 6HRS FOR THIS PROCEDURE  ALL CPT CODES FOR THIS PROCEDURE    55643, 22853 x3, 22614 x1, 89525, 22634 x1, 22330, 46180, 15355, 75354    Location: [facility] OR 23 / [facility] OR    Surgeons: Geraldo Clark MD PhD          The patient is a 74 year old  male with complaints of low back and lower extremity pain. He also endorses weakness and paresthesias. He previously underwent an L4-5 laminectomy and L4-S1 posterior instrumented fusion in May 2022 at an outside hospital for treatment of acute cauda equina syndrome. His most recent imaging demonstrates a grade 1 anterolisthesis at L4-5 as well as grade 2 retrolisthesis at L3-4. There is severe bilateral foraminal stenosis at L3-4. There is moderate central stenosis at L2-3 and L3-4. He presents today for perioperative evaluation in anticipation of L3-4 Spine Lumbar Fusion Extreme Lateral XLIF; L3-S1 revision/extension posterior fusion with L4-5 and L5-S1 transforaminal lumbar interbody fusion on 1/27/25 with Dr. Clark.     Relevant Problems   Neuro   (+) Bilateral lumbar radiculopathy   (+) Cauda equina syndrome (Multi)      Musculoskeletal   (+) Degenerative lumbar spinal stenosis       Clinical information reviewed:   Tobacco  Allergies  Meds   Med Hx  Surg Hx   Fam Hx  Soc Hx      Vitals:    01/27/25 0610   BP: 136/71   Pulse: 103   Resp: 16   Temp: 36.5 °C (97.7 °F)   SpO2: 96%       Past Surgical History:   Procedure Laterality Date   • APPENDECTOMY      in childhood   • HERNIA REPAIR      b/l inguinal   • KNEE ARTHROPLASTY     • LUMBAR LAMINECTOMY       Past Medical History:   Diagnosis Date   • Anemia    • Bilateral lumbar radiculopathy     seen by Dr. Geraldo Clark 12/03/24    • BPH (benign prostatic hyperplasia)     s/p ablation   • Cataract     s/p correction   • Cauda equina syndrome (Multi)     s/p Lumbar lamniectomy in 2022   • Insomnia     managed with Lunesta   • Irritable bowel syndrome    • Spinal stenosis    • Vision loss     wears glasses     No current facility-administered medications for this encounter.  Prior to Admission medications    Medication Sig Start Date End Date Taking? Authorizing Provider   chlorhexidine (Hibiclens) 4 % external liquid Use as directed daily preoperatively 1/17/25  Yes JIMBO Morales   chlorhexidine (Peridex) 0.12 % solution Swish and spit with 15ml of solution the night before and morning of surgery. Do not swallow. 1/17/25  Yes JIMBO Morales   ibuprofen 200 mg tablet Take 1 tablet (200 mg) by mouth every 8 hours if needed.   Yes Historical Provider, MD   melatonin 10 mg capsule Take 1 capsule (10 mg) by mouth once daily at bedtime.   Yes Historical Provider, MD   eszopiclone (Lunesta) 3 mg tablet Take 1 tablet (3 mg) by mouth once daily at bedtime. Take immediately before bedtime 4/25/24 12/3/24  Geraldo Clark MD PhD   metoprolol succinate XL (Toprol-XL) 25 mg 24 hr tablet Take 1 tablet (25 mg) by mouth if needed. If pulse is greater than 100    Historical Provider, MD     Allergies   Allergen Reactions   • Cefuroxime Rash     Social History     Tobacco Use   • Smoking status: Never   • Smokeless tobacco: Never   Substance Use Topics   • Alcohol use: Never         Chemistry    Lab Results   Component Value Date/Time     01/17/2025 1118    K 4.8 01/17/2025 1118     01/17/2025 1118    CO2 26 01/17/2025 1118    BUN 26 (H) 01/17/2025 1118    CREATININE 1.13 01/17/2025 1118    Lab Results   Component Value Date/Time    CALCIUM 9.1 01/17/2025 1118          Lab Results   Component Value Date/Time    WBC 7.4 01/17/2025 1118    HGB 14.9 01/17/2025 1118    HCT 43.4 01/17/2025 1118     01/17/2025 1118  "    No results found for: \"PROTIME\", \"PTT\", \"INR\"  Encounter Date: 01/17/25   ECG 12 lead   Result Value    Ventricular Rate 67    Atrial Rate 67    NY Interval 130    QRS Duration 72    QT Interval 382    QTC Calculation(Bazett) 403    P Axis 31    R Axis -27    T Axis -6    QRS Count 12    Q Onset 228    P Onset 163    P Offset 214    T Offset 419    QTC Fredericia 396    Narrative    Normal sinus rhythm  Normal ECG  No previous ECGs available  Confirmed by Mirza Doe ([id number]) on 1/21/2025 12:31:37 AM     No results found for this or any previous visit from the past 1095 days.      NPO Detail:  No data recorded     Physical Exam    Airway  Mallampati: II  TM distance: >3 FB  Neck ROM: full     Cardiovascular    Dental    Pulmonary    Abdominal        Anesthesia Plan    History of general anesthesia?: yes  History of complications of general anesthesia?: no    ASA 3     general     The patient is not a current smoker.  Patient did not smoke on day of procedure.    intravenous induction   Postoperative administration of opioids is intended.  Anesthetic plan and risks discussed with patient and spouse.    Plan discussed with CAA.    "

## 2025-01-28 ENCOUNTER — APPOINTMENT (OUTPATIENT)
Dept: RADIOLOGY | Facility: HOSPITAL | Age: 75
End: 2025-01-28
Payer: MEDICARE

## 2025-01-28 LAB
ANION GAP SERPL CALC-SCNC: 14 MMOL/L (ref 10–20)
BUN SERPL-MCNC: 17 MG/DL (ref 6–23)
CALCIUM SERPL-MCNC: 8.4 MG/DL (ref 8.6–10.6)
CHLORIDE SERPL-SCNC: 100 MMOL/L (ref 98–107)
CO2 SERPL-SCNC: 29 MMOL/L (ref 21–32)
CREAT SERPL-MCNC: 1.23 MG/DL (ref 0.5–1.3)
EGFRCR SERPLBLD CKD-EPI 2021: 61 ML/MIN/1.73M*2
ERYTHROCYTE [DISTWIDTH] IN BLOOD BY AUTOMATED COUNT: 12.7 % (ref 11.5–14.5)
GLUCOSE SERPL-MCNC: 186 MG/DL (ref 74–99)
HCT VFR BLD AUTO: 37.4 % (ref 41–52)
HGB BLD-MCNC: 12.8 G/DL (ref 13.5–17.5)
MCH RBC QN AUTO: 32.3 PG (ref 26–34)
MCHC RBC AUTO-ENTMCNC: 34.2 G/DL (ref 32–36)
MCV RBC AUTO: 94 FL (ref 80–100)
NRBC BLD-RTO: 0 /100 WBCS (ref 0–0)
PLATELET # BLD AUTO: 191 X10*3/UL (ref 150–450)
POTASSIUM SERPL-SCNC: 4.7 MMOL/L (ref 3.5–5.3)
RBC # BLD AUTO: 3.96 X10*6/UL (ref 4.5–5.9)
SODIUM SERPL-SCNC: 138 MMOL/L (ref 136–145)
WBC # BLD AUTO: 14.8 X10*3/UL (ref 4.4–11.3)

## 2025-01-28 PROCEDURE — 97162 PT EVAL MOD COMPLEX 30 MIN: CPT | Mod: GP

## 2025-01-28 PROCEDURE — 97530 THERAPEUTIC ACTIVITIES: CPT | Mod: GP

## 2025-01-28 PROCEDURE — 97165 OT EVAL LOW COMPLEX 30 MIN: CPT | Mod: GO

## 2025-01-28 PROCEDURE — 36415 COLL VENOUS BLD VENIPUNCTURE: CPT | Performed by: NEUROLOGICAL SURGERY

## 2025-01-28 PROCEDURE — 2500000004 HC RX 250 GENERAL PHARMACY W/ HCPCS (ALT 636 FOR OP/ED)

## 2025-01-28 PROCEDURE — 1100000001 HC PRIVATE ROOM DAILY

## 2025-01-28 PROCEDURE — 85027 COMPLETE CBC AUTOMATED: CPT | Performed by: NEUROLOGICAL SURGERY

## 2025-01-28 PROCEDURE — 2500000001 HC RX 250 WO HCPCS SELF ADMINISTERED DRUGS (ALT 637 FOR MEDICARE OP): Performed by: NEUROLOGICAL SURGERY

## 2025-01-28 PROCEDURE — 2500000002 HC RX 250 W HCPCS SELF ADMINISTERED DRUGS (ALT 637 FOR MEDICARE OP, ALT 636 FOR OP/ED): Performed by: NEUROLOGICAL SURGERY

## 2025-01-28 PROCEDURE — 2500000005 HC RX 250 GENERAL PHARMACY W/O HCPCS

## 2025-01-28 PROCEDURE — 80048 BASIC METABOLIC PNL TOTAL CA: CPT | Performed by: NEUROLOGICAL SURGERY

## 2025-01-28 PROCEDURE — 2500000004 HC RX 250 GENERAL PHARMACY W/ HCPCS (ALT 636 FOR OP/ED): Performed by: NEUROLOGICAL SURGERY

## 2025-01-28 RX ORDER — OXYCODONE HYDROCHLORIDE 5 MG/1
5 TABLET ORAL ONCE
Status: DISCONTINUED | OUTPATIENT
Start: 2025-01-28 | End: 2025-01-28

## 2025-01-28 RX ORDER — LIDOCAINE 560 MG/1
1 PATCH PERCUTANEOUS; TOPICAL; TRANSDERMAL DAILY
Status: DISCONTINUED | OUTPATIENT
Start: 2025-01-28 | End: 2025-02-01 | Stop reason: HOSPADM

## 2025-01-28 RX ORDER — IPRATROPIUM BROMIDE AND ALBUTEROL SULFATE 2.5; .5 MG/3ML; MG/3ML
3 SOLUTION RESPIRATORY (INHALATION) EVERY 6 HOURS PRN
Status: DISCONTINUED | OUTPATIENT
Start: 2025-01-28 | End: 2025-02-01 | Stop reason: HOSPADM

## 2025-01-28 RX ADMIN — ACETAMINOPHEN 650 MG: 325 TABLET, FILM COATED ORAL at 06:16

## 2025-01-28 RX ADMIN — OXYCODONE 10 MG: 5 TABLET ORAL at 02:23

## 2025-01-28 RX ADMIN — ACETAMINOPHEN 650 MG: 325 TABLET, FILM COATED ORAL at 19:08

## 2025-01-28 RX ADMIN — CYCLOBENZAPRINE 5 MG: 10 TABLET, FILM COATED ORAL at 21:13

## 2025-01-28 RX ADMIN — ZOLPIDEM TARTRATE 5 MG: 5 TABLET, FILM COATED ORAL at 21:12

## 2025-01-28 RX ADMIN — OXYCODONE 10 MG: 5 TABLET ORAL at 21:12

## 2025-01-28 RX ADMIN — POLYETHYLENE GLYCOL 3350 17 G: 17 POWDER, FOR SOLUTION ORAL at 08:46

## 2025-01-28 RX ADMIN — CYCLOBENZAPRINE 5 MG: 10 TABLET, FILM COATED ORAL at 08:45

## 2025-01-28 RX ADMIN — OXYCODONE 5 MG: 5 TABLET ORAL at 16:56

## 2025-01-28 RX ADMIN — LIDOCAINE 1 PATCH: 560 PATCH PERCUTANEOUS; TOPICAL; TRANSDERMAL at 08:46

## 2025-01-28 RX ADMIN — ACETAMINOPHEN 650 MG: 325 TABLET, FILM COATED ORAL at 12:07

## 2025-01-28 RX ADMIN — SODIUM CHLORIDE 1000 ML: 9 INJECTION, SOLUTION INTRAVENOUS at 19:05

## 2025-01-28 RX ADMIN — OXYCODONE 5 MG: 5 TABLET ORAL at 06:13

## 2025-01-28 RX ADMIN — Medication 10 MG: at 21:12

## 2025-01-28 RX ADMIN — OXYCODONE 5 MG: 5 TABLET ORAL at 12:12

## 2025-01-28 ASSESSMENT — PAIN SCALES - GENERAL
PAINLEVEL_OUTOF10: 9
PAINLEVEL_OUTOF10: 8
PAINLEVEL_OUTOF10: 9
PAINLEVEL_OUTOF10: 5 - MODERATE PAIN
PAINLEVEL_OUTOF10: 8
PAINLEVEL_OUTOF10: 5 - MODERATE PAIN
PAINLEVEL_OUTOF10: 7
PAINLEVEL_OUTOF10: 6
PAINLEVEL_OUTOF10: 8
PAINLEVEL_OUTOF10: 8

## 2025-01-28 ASSESSMENT — COGNITIVE AND FUNCTIONAL STATUS - GENERAL
MOBILITY SCORE: 10
TURNING FROM BACK TO SIDE WHILE IN FLAT BAD: A LOT
HELP NEEDED FOR BATHING: A LOT
HELP NEEDED FOR BATHING: A LOT
TOILETING: A LOT
WALKING IN HOSPITAL ROOM: A LOT
MOVING TO AND FROM BED TO CHAIR: A LOT
DRESSING REGULAR LOWER BODY CLOTHING: A LOT
CLIMB 3 TO 5 STEPS WITH RAILING: A LITTLE
STANDING UP FROM CHAIR USING ARMS: A LOT
CLIMB 3 TO 5 STEPS WITH RAILING: TOTAL
STANDING UP FROM CHAIR USING ARMS: A LITTLE
MOBILITY SCORE: 15
MOVING TO AND FROM BED TO CHAIR: A LOT
DRESSING REGULAR UPPER BODY CLOTHING: A LOT
MOVING FROM LYING ON BACK TO SITTING ON SIDE OF FLAT BED WITH BEDRAILS: A LOT
PERSONAL GROOMING: A LITTLE
DAILY ACTIVITIY SCORE: 14
DRESSING REGULAR UPPER BODY CLOTHING: A LOT
TOILETING: A LOT
TURNING FROM BACK TO SIDE WHILE IN FLAT BAD: TOTAL
DAILY ACTIVITIY SCORE: 15
PERSONAL GROOMING: A LITTLE
DRESSING REGULAR LOWER BODY CLOTHING: TOTAL
WALKING IN HOSPITAL ROOM: A LITTLE
MOVING FROM LYING ON BACK TO SITTING ON SIDE OF FLAT BED WITH BEDRAILS: A LOT

## 2025-01-28 ASSESSMENT — PAIN - FUNCTIONAL ASSESSMENT
PAIN_FUNCTIONAL_ASSESSMENT: 0-10
PAIN_FUNCTIONAL_ASSESSMENT: UNABLE TO SELF-REPORT

## 2025-01-28 ASSESSMENT — PAIN DESCRIPTION - LOCATION
LOCATION: BACK

## 2025-01-28 ASSESSMENT — ACTIVITIES OF DAILY LIVING (ADL)
LACK_OF_TRANSPORTATION: NO
BATHING_ASSISTANCE: MODERATE
ADL_ASSISTANCE: INDEPENDENT
ADL_ASSISTANCE: INDEPENDENT

## 2025-01-28 ASSESSMENT — PAIN DESCRIPTION - ORIENTATION: ORIENTATION: MID

## 2025-01-28 NOTE — PROGRESS NOTES
Physical Therapy    Physical Therapy Evaluation & Treatment    Patient Name: Ernie Flores  MRN: [id number]  Department: [place] [address]  Room: [facility]  Today's Date: 1/28/2025   Time Calculation  Start Time: 0905  Stop Time: 0933  Time Calculation (min): 28 min    Assessment/Plan   PT Assessment  PT Assessment Results: Decreased endurance, Decreased mobility, Impaired balance  Rehab Prognosis: Good  Barriers to Discharge Home: Physical needs  Physical Needs: 24hr mobility assistance needed, Ambulating household distances limited by function/safety, High falls risk due to function or environment  Evaluation/Treatment Tolerance: Patient limited by pain  Medical Staff Made Aware: Yes  End of Session Communication: Bedside nurse  Assessment Comment: Patient is a 75yo M presenting with Lumbar pseudoarthrosis 1/27 s/p L3/4, L4/5 prone XLiF, L5/S1 TLIF, L3-S1 extension/revision of fusion. Patient is indep at baseline, has support from significant other if needed. Patient has history of many falls at baseline. Patient currently below baseline and required x2 assist for mobility and increased pain. patient is high risk for falls. dc rec high intensity therapy at this time.  End of Session Patient Position: Up in chair, Alarm on   IP OR SWING BED PT PLAN  Inpatient or Swing Bed: Inpatient  PT Plan  Treatment/Interventions: Transfer training, Bed mobility, Gait training, Stair training, Balance training, Neuromuscular re-education, Strengthening, Endurance training, Range of motion, Therapeutic activity, Therapeutic exercise  PT Plan: Ongoing PT  PT Frequency: Daily  PT Discharge Recommendations: High intensity level of continued care  Equipment Recommended upon Discharge:  (tbd)  PT Recommended Transfer Status: Assist x2, Assistive device  PT - OK to Discharge: Yes (indicates PT eval complete and dc rec determined)      Subjective     General Visit Information:  General  Reason for Referral: Lumbar pseudoarthrosis 1/27 s/p L3/4,  L4/5 prone XLiF, L5/S1 TLIF, L3-S1 extension/revision of fusion  Past Medical History Relevant to Rehab: BPH, cataracts, insomnia, IBS, cauda equina syndrome s/p L4-S1 decompression and fusion in 2022, p/w ASD  Family/Caregiver Present: Yes  Caregiver Feedback: significant other present, supportive  Co-Treatment: OT  Co-Treatment Reason: OT present to mobilize per pt request, high pain levels and required x2 assist.  Prior to Session Communication: Bedside nurse  Patient Position Received: Bed, 3 rail up, Alarm off, not on at start of session  General Comment: pt supine in bed, RN cleared. pt cooperative but reporting high pain levels.  Home Living:  Home Living  Type of Home: House  Lives With: Alone  Home Adaptive Equipment: Walker rolling or standard, Cane (rollator)  Home Layout: Bed/bath upstairs, Full bath main level (reports he can stay on the couch if needed)  Home Access: Stairs to enter with rails  Entrance Stairs-Number of Steps: 1  Bathroom Shower/Tub: Tub/shower unit  Bathroom Equipment: Grab bars in shower (pt not sure if he still has a shower chair or not)  Home Living Comments: reports he agustin dc to his significant others home with a one floor set up  Prior Level of Function:  Prior Function Per Pt/Caregiver Report  Level of [place]: Independent with ADLs and functional transfers, Independent with homemaking with ambulation  ADL Assistance: Independent  Homemaking Assistance: Independent  Ambulatory Assistance: Independent  Vocational: Full time employment (physcian)  Prior Function Comments: reports multiple falls, 6 falls in last 6 months.  Precautions:  Precautions  Medical Precautions: Fall precautions  Post-Surgical Precautions: Spinal precautions    Objective   Pain:  Pain Assessment  Pain Assessment: 0-10  0-10 (Numeric) Pain Score: 9  Pain Type: Acute pain, Surgical pain  Pain Location: Back (chest, groin)  Pain Interventions: Repositioned  Cognition:  Cognition  Overall Cognitive Status:  Within Functional Limits  Orientation Level: Oriented X4    General Assessments:     Activity Tolerance  Endurance: Tolerates 10 - 20 min exercise with multiple rests    Sensation  Light Touch:  (reports N/T in bilateral LEs LLE >RLE)    Static Sitting Balance  Static Sitting-Level of Assistance:  (min progressing to CGA)  Functional Assessments:  Bed Mobility  Bed Mobility: Yes  Bed Mobility 1  Bed Mobility 1: Rolling left  Level of Assistance 1: Moderate assistance, +2  Bed Mobility 2  Bed Mobility  2: Side lying left to sit  Level of Assistance 2: Maximum assistance, +2  Bed Mobility Comments 2: edu on log roll, assist with trunk and BLEs. cues throughout for sequencing    Transfers  Transfer: Yes  Transfer 1  Transfer From 1: Sit to, Stand to  Transfer to 1: Stand, Sit  Technique 1: Sit to stand, Stand to sit  Transfer Device 1: Walker  Transfer Level of Assistance 1: Moderate assistance, +2  Trials/Comments 1: cues for hand placement on FWW, bed height elevated. stood from EOB, sat in chair.    Ambulation/Gait Training  Ambulation/Gait Training Performed: Yes  Ambulation/Gait Training 1  Surface 1: Level tile  Device 1: Rolling walker  Assistance 1: Moderate assistance (x2)  Quality of Gait 1: Shuffling gait, Decreased step length, Inconsistent stride length  Comments/Distance (ft) 1: cues for sequencing with FWW, ambulated 3' lateral steps, then pivot steps to chair.  Extremity/Trunk Assessments:  RLE   RLE :  (4/5)  LLE   LLE :  (3+/5)  Treatments:  Therapeutic Activity  Therapeutic Activity Performed: Yes  Therapeutic Activity 1: increased time to complete all mobility. edu on precautions and log roll. increased cues and assist required to complete.  Outcome Measures:  [facility] Basic Mobility  Turning from your back to your side while in a flat bed without using bedrails: A lot  Moving from lying on your back to sitting on the side of a flat bed without using bedrails: Total  Moving to and from bed to chair  (including a wheelchair): A lot  Standing up from a chair using your arms (e.g. wheelchair or bedside chair): A lot  To walk in hospital room: A lot  Climbing 3-5 steps with railing: Total  Basic Mobility - Total Score: 10    Encounter Problems       Encounter Problems (Active)       Mobility       STG - Patient will ambulate > 100' with LRAD CGA (Progressing)       Start:  01/28/25    Expected End:  02/11/25            STG - Patient will ascend and descend four steps, if dc to SO home, CGA (Progressing)       Start:  01/28/25    Expected End:  02/11/25               PT Transfers       STG - Transfer from bed to chair LRAD CGA (Progressing)       Start:  01/28/25    Expected End:  02/11/25            STG - Patient to transfer to and from sit to supine with log roll, CGA (Progressing)       Start:  01/28/25    Expected End:  02/11/25            STG - Patient will roll SBA (Progressing)       Start:  01/28/25    Expected End:  02/11/25            STG - Patient will transfer sit to and from stand LRAD CGA (Progressing)       Start:  01/28/25    Expected End:  02/11/25               Pain - Adult              Education Documentation  Precautions, taught by Chhaya Cameron PT at 1/28/2025 11:04 AM.  Learner: Patient  Readiness: Acceptance  Method: Explanation  Response: Verbalizes Understanding  Comment: edu on role of PT, dc plan, safety and spinal precautions/log roll    Mobility Training, taught by Chhaya Cameron PT at 1/28/2025 11:04 AM.  Learner: Patient  Readiness: Acceptance  Method: Explanation  Response: Verbalizes Understanding  Comment: edu on role of PT, dc plan, safety and spinal precautions/log roll    Education Comments  No comments found.

## 2025-01-28 NOTE — PROGRESS NOTES
"Ernie Flores is a 74 y.o. male on day 1 of admission presenting with Lumbar pseudoarthrosis.    Subjective   No acute events overnight    Objective     Physical Exam  A&Ox3  Face symmetric  RUE 5/5  LUE 5/5  RLE DF4 EHL 0 (baseline)  LLE HF 4+ o/w 5/5  Sensation intact to light touch throughout all extremities  Incisions c/d/i    Last Recorded Vitals  Blood pressure 117/71, pulse 104, temperature 36.2 °C (97.2 °F), temperature source Temporal, resp. rate 16, height 1.753 m (5' 9\"), weight 98.1 kg (216 lb 3.2 oz), SpO2 95%.  Intake/Output last 3 Shifts:  I/O last 3 completed shifts:  In: 2750 (28 mL/kg) [I.V.:2750 (28 mL/kg)]  Out: 1307 (13.3 mL/kg) [Urine:905 (0.3 mL/kg/hr); Drains:2; Blood:400]  Weight: 98.1 kg     Relevant Results    Assessment/Plan   Assessment & Plan  Lumbar pseudoarthrosis    Spondylolisthesis of lumbar region    Bilateral lumbar radiculopathy    Patient is a 74 year old male w/ h/o BPH, cataracts, insomnia, IBS, cauda equina syndrome s/p L4-S1 decompression and fusion in 2022, p/w ASD    1/27 s/p L3/4, L4/5 prone XLiF, L5/S1 TLIF, L3-S1 extension/revision of fusion    PLAN  Floor  Continue drain  EOS today  Void trial  PTOT  SCDs, LVX (ppx)    Adama Thomas MD      "

## 2025-01-28 NOTE — CARE PLAN
Problem: Discharge Planning  Goal: Discharge to home or other facility with appropriate resources  1/28/2025 1541 by Reggie Dewitt, RN  Outcome: Progressing  1/28/2025 1539 by Reggie Dewitt, RN  Outcome: Progressing   The patient's goals for the shift include      The clinical goals for the shift include adequate pain managment

## 2025-01-28 NOTE — CARE PLAN
The patient's goals for the shift include      The clinical goals for the shift include      Problem: Skin  Goal: Decreased wound size/increased tissue granulation at next dressing change  Outcome: Progressing  Goal: Participates in plan/prevention/treatment measures  Outcome: Progressing  Goal: Prevent/manage excess moisture  Outcome: Progressing  Goal: Prevent/minimize sheer/friction injuries  Outcome: Progressing  Goal: Promote/optimize nutrition  Outcome: Progressing  Goal: Promote skin healing  Outcome: Progressing     Problem: Pain - Adult  Goal: Verbalizes/displays adequate comfort level or baseline comfort level  Outcome: Progressing     Problem: Safety - Adult  Goal: Free from fall injury  Outcome: Progressing     Problem: Discharge Planning  Goal: Discharge to home or other facility with appropriate resources  Outcome: Progressing     Problem: Chronic Conditions and Co-morbidities  Goal: Patient's chronic conditions and co-morbidity symptoms are monitored and maintained or improved  Outcome: Progressing     Problem: Nutrition  Goal: Nutrient intake appropriate for maintaining nutritional needs  Outcome: Progressing     Problem: Fall/Injury  Goal: Not fall by end of shift  Outcome: Progressing  Goal: Be free from injury by end of the shift  Outcome: Progressing  Goal: Verbalize understanding of personal risk factors for fall in the hospital  Outcome: Progressing  Goal: Verbalize understanding of risk factor reduction measures to prevent injury from fall in the home  Outcome: Progressing  Goal: Use assistive devices by end of the shift  Outcome: Progressing  Goal: Pace activities to prevent fatigue by end of the shift  Outcome: Progressing     Problem: Pain  Goal: Takes deep breaths with improved pain control throughout the shift  Outcome: Progressing  Goal: Turns in bed with improved pain control throughout the shift  Outcome: Progressing  Goal: Walks with improved pain control throughout the  shift  Outcome: Progressing  Goal: Performs ADL's with improved pain control throughout shift  Outcome: Progressing  Goal: Participates in PT with improved pain control throughout the shift  Outcome: Progressing  Goal: Free from opioid side effects throughout the shift  Outcome: Progressing  Goal: Free from acute confusion related to pain meds throughout the shift  Outcome: Progressing

## 2025-01-28 NOTE — CARE PLAN
Problem: Discharge Planning  Goal: Discharge to home or other facility with appropriate resources  Outcome: Progressing     Problem: Skin  Goal: Decreased wound size/increased tissue granulation at next dressing change  Outcome: Met  Goal: Participates in plan/prevention/treatment measures  Outcome: Met  Goal: Prevent/manage excess moisture  Outcome: Met  Goal: Prevent/minimize sheer/friction injuries  Outcome: Met  Goal: Promote/optimize nutrition  Outcome: Met  Goal: Promote skin healing  Outcome: Met     Problem: Pain - Adult  Goal: Verbalizes/displays adequate comfort level or baseline comfort level  Outcome: Met     Problem: Safety - Adult  Goal: Free from fall injury  Outcome: Met     Problem: Chronic Conditions and Co-morbidities  Goal: Patient's chronic conditions and co-morbidity symptoms are monitored and maintained or improved  Outcome: Met     Problem: Nutrition  Goal: Nutrient intake appropriate for maintaining nutritional needs  Outcome: Met     Problem: Fall/Injury  Goal: Not fall by end of shift  Outcome: Met  Goal: Be free from injury by end of the shift  Outcome: Met  Goal: Verbalize understanding of personal risk factors for fall in the hospital  Outcome: Met  Goal: Verbalize understanding of risk factor reduction measures to prevent injury from fall in the home  Outcome: Met  Goal: Use assistive devices by end of the shift  Outcome: Met  Goal: Pace activities to prevent fatigue by end of the shift  Outcome: Met     Problem: Pain  Goal: Takes deep breaths with improved pain control throughout the shift  Outcome: Met  Goal: Turns in bed with improved pain control throughout the shift  Outcome: Met  Goal: Walks with improved pain control throughout the shift  Outcome: Met  Goal: Performs ADL's with improved pain control throughout shift  Outcome: Met  Goal: Participates in PT with improved pain control throughout the shift  Outcome: Met  Goal: Free from opioid side effects throughout the  shift  Outcome: Met  Goal: Free from acute confusion related to pain meds throughout the shift  Outcome: Met   The patient's goals for the shift include      The clinical goals for the shift include adequate pain managment

## 2025-01-28 NOTE — PROGRESS NOTES
Occupational Therapy    Evaluation    Patient Name: Ernie Flores  MRN: [id number]  Department: [facility] 6  Room: [facility]-A  Today's Date: 1/28/2025  Time Calculation  Start Time: 0920  Stop Time: 0931  Time Calculation (min): 11 min        Assessment:  Prognosis: Good  Barriers to Discharge Home: Physical needs, Caregiver assistance  Caregiver Assistance: Patient lives alone and/or does not have reliable caregiver assistance  Physical Needs: 24hr mobility assistance needed, 24hr ADL assistance needed, High falls risk due to function or environment  Evaluation/Treatment Tolerance: Patient limited by pain  Medical Staff Made Aware: Yes  End of Session Communication: Bedside nurse  End of Session Patient Position: Up in chair, Alarm on  OT Assessment Results: Decreased ADL status, Decreased upper extremity strength, Decreased safe judgment during ADL, Decreased endurance, Decreased functional mobility  Prognosis: Good  Barriers to Discharge: None  Evaluation/Treatment Tolerance: Patient limited by pain  Medical Staff Made Aware: Yes  Strengths: Attitude of self, Premorbid level of function  Barriers to Participation: Housing layout, Insight into problems, Comorbidities  Plan:  Treatment Interventions: ADL retraining, Functional transfer training, Endurance training, Patient/family training, Equipment evaluation/education, Compensatory technique education  OT Frequency: 3 times per week  OT Discharge Recommendations: High intensity level of continued care  Equipment Recommended upon Discharge:  (TBD)  OT Recommended Transfer Status: Assist of 2  OT - OK to Discharge:  (eval complete)  Treatment Interventions: ADL retraining, Functional transfer training, Endurance training, Patient/family training, Equipment evaluation/education, Compensatory technique education    Subjective   Current Problem:  1. Bilateral lumbar radiculopathy  Insert and maintain peripheral IV    Saline lock IV    Type And Screen    povidone-iodine 5  % kit kit    Admit to inpatient    Insert and maintain peripheral IV    Saline lock IV    Admit to inpatient    Inpatient consult to Respiratory Care    Inpatient consult to Respiratory Care    CANCELED: NPO Diet Except: Sips with meds; Effective now    CANCELED: Height and weight    CANCELED: Apply sequential compression device    CANCELED: Apply GITA hose    CANCELED: Full code    CANCELED: NPO Diet Except: Sips with meds; Effective now    CANCELED: Height and weight    CANCELED: Apply sequential compression device    CANCELED: Apply GITA hose    CANCELED: Full code      2. Spondylolisthesis of lumbar region  Insert and maintain peripheral IV    Saline lock IV    Type And Screen    povidone-iodine 5 % kit kit    Admit to inpatient    Insert and maintain peripheral IV    Saline lock IV    Admit to inpatient    Inpatient consult to Respiratory Care    Inpatient consult to Respiratory Care    CANCELED: NPO Diet Except: Sips with meds; Effective now    CANCELED: Height and weight    CANCELED: Apply sequential compression device    CANCELED: Apply GITA hose    CANCELED: Full code    CANCELED: NPO Diet Except: Sips with meds; Effective now    CANCELED: Height and weight    CANCELED: Apply sequential compression device    CANCELED: Apply GITA hose    CANCELED: Full code      3. Lumbar pseudoarthrosis  Insert and maintain peripheral IV    Saline lock IV    Type And Screen    povidone-iodine 5 % kit kit    Admit to inpatient    Insert and maintain peripheral IV    Saline lock IV    Admit to inpatient    Inpatient consult to Respiratory Care    Inpatient consult to Respiratory Care    CANCELED: NPO Diet Except: Sips with meds; Effective now    CANCELED: Height and weight    CANCELED: Apply sequential compression device    CANCELED: Apply GITA hose    CANCELED: Full code    CANCELED: NPO Diet Except: Sips with meds; Effective now    CANCELED: Height and weight    CANCELED: Apply sequential compression device    CANCELED: Apply  GITA huggins    CANCELED: Full code        General:  General  Reason for Referral: Lumbar pseudoarthrosis 1/27 s/p L3/4, L4/5 prone XLiF, L5/S1 TLIF, L3-S1 extension/revision of fusion  Past Medical History Relevant to Rehab: BPH, cataracts, insomnia, IBS, cauda equina syndrome s/p L4-S1 decompression and fusion in 2022, p/w ASD  Family/Caregiver Present: Yes  Caregiver Feedback: Significant other present, receptive and supportive of therapy  Co-Treatment: PT  Co-Treatment Reason: Pt requesting both disciplines present for session, co-eval for safety and to optimize pt's therapeutic potential  Prior to Session Communication: Bedside nurse  Patient Position Received: Bed, 3 rail up, Alarm off, not on at start of session  Preferred Learning Style: visual, verbal  General Comment: Pt agreeable to session however limited by pain  Precautions:  Medical Precautions: Fall precautions  Post-Surgical Precautions: Spinal precautions    Pain:  Pain Assessment  Pain Assessment: 0-10  0-10 (Numeric) Pain Score: 9  Pain Type: Acute pain, Surgical pain  Pain Location: Back (chest, groin)  Pain Interventions: Repositioned    Objective   Cognition:  Overall Cognitive Status: Within Functional Limits  Arousal/Alertness: Appropriate responses to stimuli  Orientation Level: Oriented X4  Following Commands: Follows all commands and directions without difficulty  Safety Judgment: Decreased awareness of need for safety precautions  Problem Solving: Assistance required to identify errors made  Insight: Mild           Home Living:  Type of Home: House  Lives With: Alone  Home Adaptive Equipment: Walker rolling or standard, Cane (rollator)  Home Layout: Bed/bath upstairs, Full bath main level (reports he can stay on couch on 1st floor)  Home Access: Stairs to enter with rails  Entrance Stairs-Number of Steps: 1  Bathroom Shower/Tub: Tub/shower unit  Bathroom Equipment: Grab bars in shower (does not recall if he has a shower chair)  Home Living  Comments: reports may d/c to significant other's home that is a 1 level set-up  Prior Function:  Level of [place]: Independent with ADLs and functional transfers, Independent with homemaking with ambulation  ADL Assistance: Independent  Homemaking Assistance: Independent  Ambulatory Assistance: Independent  Vocational: Full time employment (physician)  Prior Function Comments: reports multiple falls, 6 falls in last 6 months.    ADL:  Eating Assistance: Independent  Grooming Assistance: Stand by  Grooming Deficit:  (anticipated, pt declined to complete this session)  Bathing Assistance: Moderate  Bathing Deficit:  (anticipated)  UE Dressing Assistance: Moderate  UE Dressing Deficit:  (gown management)  LE Dressing Assistance: Total  LE Dressing Deficit:  (socks, limited by pain and functional reach)  Toileting Assistance with Device: Moderate  Toileting Deficit:  (anticipated, declined this session)  ADL Comments: Pt educated on spinal precautions pertaining to ADL and functional activity. Verbalized understanding.  Activity Tolerance:  Endurance: Tolerates 10 - 20 min exercise with multiple rests  Bed Mobility/Transfers: Bed Mobility  Bed Mobility: Yes  Bed Mobility 1  Bed Mobility 1: Supine to sitting  Level of Assistance 1: Maximum assistance, +2  Bed Mobility Comments 1: Step by step instructions for log rolling technique, increased physical assist to guide BLEs over EOB and support trunk to upright positioning    Transfers  Transfer: Yes  Transfer 1  Transfer From 1: Bed to  Transfer to 1: Stand  Technique 1: Sit to stand  Transfer Device 1: Walker  Transfer Level of Assistance 1: Moderate assistance, +2  Trials/Comments 1: Bed height elevated, instructions for hand plcement, increased effort to stand  Transfers 2  Transfer From 2: Stand to  Transfer to 2: Chair with arms  Technique 2: Sit to stand, To left  Transfer Device 2: Walker  Transfer Level of Assistance 2: Moderate assistance, +2  Trials/Comments  2: Verbal cueing for mobility sequence and maintaining proximity to AD. Increased physical assist required to control seated descent    Sitting Balance:  Static Sitting Balance  Static Sitting-Balance Support: Bilateral upper extremity supported  Static Sitting-Level of Assistance: Contact guard  Dynamic Sitting Balance  Dynamic Sitting-Balance Support: Bilateral upper extremity supported  Dynamic Sitting-Level of Assistance: Minimum assistance  Standing Balance:  Static Standing Balance  Static Standing-Balance Support: Bilateral upper extremity supported  Static Standing-Level of Assistance: Moderate assistance  Dynamic Standing Balance  Dynamic Standing-Balance Support: Bilateral upper extremity supported  Dynamic Standing-Level of Assistance: Maximum assistance     Sensation:  Sensation Comment: Pt reports significant neuropathy in feet at baseline. Positioned lower in bed with feet hanging off bed d/t pt being unable to tolerate his feet resting on bed  Strength:  Strength Comments: Formal strength testing deferred 2/2 spinal precautions, functional strength >=3+/5  Perception:  Inattention/Neglect: Appears intact  Initiation: Appears intact  Motor Planning: Appears intact  Perseveration: Not present  Coordination:  Movements are Fluid and Coordinated: Yes   Hand Function:  Gross Grasp: Functional  Coordination: Functional  Extremities: RUE   RUE : Within Functional Limits and LUE   LUE: Within Functional Limits    Outcome Measures:[facility] Daily Activity  Putting on and taking off regular lower body clothing: Total  Bathing (including washing, rinsing, drying): A lot  Putting on and taking off regular upper body clothing: A lot  Toileting, which includes using toilet, bedpan or urinal: A lot  Taking care of personal grooming such as brushing teeth: A little  Eating Meals: None  Daily Activity - Total Score: 14    Brief Confusion Assessment Method (bCAM)  Feature 1: Altered Mental Status or Fluctuating Course:  No  CAM Result: CAM -    Education Documentation  Body Mechanics, taught by Angelique Asher OT at 1/28/2025 12:03 PM.  Learner: Patient  Readiness: Acceptance  Method: Explanation, Demonstration  Response: Verbalizes Understanding, Demonstrated Understanding, Needs Reinforcement    Precautions, taught by Angelique Asher OT at 1/28/2025 12:03 PM.  Learner: Patient  Readiness: Acceptance  Method: Explanation, Demonstration  Response: Verbalizes Understanding, Demonstrated Understanding, Needs Reinforcement    ADL Training, taught by Angelique Asher OT at 1/28/2025 12:03 PM.  Learner: Patient  Readiness: Acceptance  Method: Explanation, Demonstration  Response: Verbalizes Understanding, Demonstrated Understanding, Needs Reinforcement    EDUCATION:  Education  Individual(s) Educated: Patient  Education Provided: Diagnosis & Precautions, Fall precautons, Risk and benefits of OT discussed with patient or other, POC discussed and agreed upon  Patient Response to Education: Patient/Caregiver Verbalized Understanding of Information    Goals:  Encounter Problems       Encounter Problems (Active)       ADLs       Patient with complete lower body dressing with modified independent level of assistance donning and doffing all LE clothes  with PRN adaptive equipment (Progressing)       Start:  01/28/25    Expected End:  02/11/25            Patient will complete daily grooming tasks with modified independent level of assistance and PRN adaptive equipment while standing. (Progressing)       Start:  01/28/25    Expected End:  02/11/25            Patient will complete toileting including hygiene clothing management/hygiene with independent level of assistance. (Progressing)       Start:  01/28/25    Expected End:  02/11/25               COGNITION/SAFETY       Patient will recall and adhere to spinal precautions during all functional mobility/ADL tasks in order to demonstrate improved understanding and promote healing post op  (Progressing)       Start:  01/28/25    Expected End:  02/11/25               MOBILITY       Patient will perform Functional mobility Household distances/Community Distances with modified independent level of assistance and least restrictive device in order to improve safety and functional mobility. (Progressing)       Start:  01/28/25    Expected End:  02/11/25               TRANSFERS       Patient will perform bed mobility modified independent level of assistance in order to improve safety and independence with mobility (Progressing)       Start:  01/28/25    Expected End:  02/11/25            Patient will complete functional transfer to chair/commode with least restrictive device with modified independent level of assistance. (Progressing)       Start:  01/28/25    Expected End:  02/11/25

## 2025-01-28 NOTE — PROGRESS NOTES
01/28/25 1100   Discharge Planning   Living Arrangements Spouse/significant other   Support Systems Spouse/significant other   Assistance Needed Yes   Type of Residence Private residence   Number of Stairs to Enter Residence 1   Number of Stairs Within Residence 0   Do you have animals or pets at home? Yes   Type of Animals or Pets Many Cats   Who is requesting discharge planning? Provider   Home or Post Acute Services Post acute facilities (Rehab/SNF/etc)   Type of Post Acute Facility Services Rehab   Expected Discharge Disposition Rehab   Does the patient need discharge transport arranged? Yes   RoundTrip coordination needed? Yes   Has discharge transport been arranged? No   Financial Resource Strain   How hard is it for you to pay for the very basics like food, housing, medical care, and heating? Not very   Housing Stability   In the last 12 months, was there a time when you were not able to pay the mortgage or rent on time? N   In the past 12 months, how many times have you moved where you were living? 0   At any time in the past 12 months, were you homeless or living in a shelter (including now)? N   Transportation Needs   In the past 12 months, has lack of transportation kept you from medical appointments or from getting medications? no   In the past 12 months, has lack of transportation kept you from meetings, work, or from getting things needed for daily living? No   Patient Choice   Provider Choice list and CMS website (https://medicare.gov/care-compare#search) for post-acute Quality and Resource Measure Data were provided and reviewed with: Patient   Patient / Family choosing to utilize agency / facility established prior to hospitalization No   Stroke Family Assessment   Stroke Family Assessment Needed No   Intensity of Service   Intensity of Service 0-30 min     I spoke with Ernie regarding discharge planning and home going needs. Patient states that he lives home alone where he is usually  independent with ADL's he does have a walker and rollator in the home. Patient is not ready for discharge at this time pending drain removal, recommended for Acute Rehab I will give patient a list of facility choices. I will continue to follow with a safe discharge plan.

## 2025-01-29 ENCOUNTER — APPOINTMENT (OUTPATIENT)
Dept: RADIOLOGY | Facility: HOSPITAL | Age: 75
End: 2025-01-29
Payer: MEDICARE

## 2025-01-29 LAB
ANION GAP SERPL CALC-SCNC: 13 MMOL/L (ref 10–20)
BUN SERPL-MCNC: 12 MG/DL (ref 6–23)
CALCIUM SERPL-MCNC: 8.4 MG/DL (ref 8.6–10.6)
CHLORIDE SERPL-SCNC: 100 MMOL/L (ref 98–107)
CO2 SERPL-SCNC: 27 MMOL/L (ref 21–32)
CREAT SERPL-MCNC: 0.91 MG/DL (ref 0.5–1.3)
EGFRCR SERPLBLD CKD-EPI 2021: 87 ML/MIN/1.73M*2
ERYTHROCYTE [DISTWIDTH] IN BLOOD BY AUTOMATED COUNT: 13.1 % (ref 11.5–14.5)
GLUCOSE SERPL-MCNC: 152 MG/DL (ref 74–99)
HCT VFR BLD AUTO: 32.9 % (ref 41–52)
HGB BLD-MCNC: 11.3 G/DL (ref 13.5–17.5)
MCH RBC QN AUTO: 32.1 PG (ref 26–34)
MCHC RBC AUTO-ENTMCNC: 34.3 G/DL (ref 32–36)
MCV RBC AUTO: 94 FL (ref 80–100)
NRBC BLD-RTO: 0 /100 WBCS (ref 0–0)
PLATELET # BLD AUTO: 169 X10*3/UL (ref 150–450)
POTASSIUM SERPL-SCNC: 3.7 MMOL/L (ref 3.5–5.3)
RBC # BLD AUTO: 3.52 X10*6/UL (ref 4.5–5.9)
SODIUM SERPL-SCNC: 136 MMOL/L (ref 136–145)
WBC # BLD AUTO: 17.6 X10*3/UL (ref 4.4–11.3)

## 2025-01-29 PROCEDURE — 2500000001 HC RX 250 WO HCPCS SELF ADMINISTERED DRUGS (ALT 637 FOR MEDICARE OP)

## 2025-01-29 PROCEDURE — 2500000002 HC RX 250 W HCPCS SELF ADMINISTERED DRUGS (ALT 637 FOR MEDICARE OP, ALT 636 FOR OP/ED): Performed by: NEUROLOGICAL SURGERY

## 2025-01-29 PROCEDURE — 71045 X-RAY EXAM CHEST 1 VIEW: CPT

## 2025-01-29 PROCEDURE — 2500000005 HC RX 250 GENERAL PHARMACY W/O HCPCS

## 2025-01-29 PROCEDURE — 97116 GAIT TRAINING THERAPY: CPT | Mod: GP

## 2025-01-29 PROCEDURE — 1100000001 HC PRIVATE ROOM DAILY

## 2025-01-29 PROCEDURE — 2500000001 HC RX 250 WO HCPCS SELF ADMINISTERED DRUGS (ALT 637 FOR MEDICARE OP): Performed by: NEUROLOGICAL SURGERY

## 2025-01-29 PROCEDURE — 2500000004 HC RX 250 GENERAL PHARMACY W/ HCPCS (ALT 636 FOR OP/ED): Performed by: NEUROLOGICAL SURGERY

## 2025-01-29 PROCEDURE — 80048 BASIC METABOLIC PNL TOTAL CA: CPT | Performed by: NEUROLOGICAL SURGERY

## 2025-01-29 PROCEDURE — 36415 COLL VENOUS BLD VENIPUNCTURE: CPT | Performed by: NEUROLOGICAL SURGERY

## 2025-01-29 PROCEDURE — 97530 THERAPEUTIC ACTIVITIES: CPT | Mod: GO

## 2025-01-29 PROCEDURE — 85027 COMPLETE CBC AUTOMATED: CPT | Performed by: NEUROLOGICAL SURGERY

## 2025-01-29 RX ORDER — METOPROLOL SUCCINATE 25 MG/1
25 TABLET, EXTENDED RELEASE ORAL DAILY PRN
Status: DISCONTINUED | OUTPATIENT
Start: 2025-01-29 | End: 2025-02-01 | Stop reason: HOSPADM

## 2025-01-29 RX ORDER — BISACODYL 5 MG
10 TABLET, DELAYED RELEASE (ENTERIC COATED) ORAL DAILY
Status: DISCONTINUED | OUTPATIENT
Start: 2025-01-29 | End: 2025-01-30

## 2025-01-29 RX ORDER — AMOXICILLIN 250 MG
2 CAPSULE ORAL 2 TIMES DAILY
Status: CANCELLED
Start: 2025-01-29

## 2025-01-29 RX ORDER — AMOXICILLIN 250 MG
1 CAPSULE ORAL 2 TIMES DAILY
Status: DISCONTINUED | OUTPATIENT
Start: 2025-01-29 | End: 2025-01-29

## 2025-01-29 RX ORDER — AMOXICILLIN 250 MG
2 CAPSULE ORAL 2 TIMES DAILY
Status: DISCONTINUED | OUTPATIENT
Start: 2025-01-29 | End: 2025-02-01 | Stop reason: HOSPADM

## 2025-01-29 RX ORDER — METOPROLOL SUCCINATE 25 MG/1
25 TABLET, EXTENDED RELEASE ORAL AS NEEDED
Status: CANCELLED
Start: 2025-01-29

## 2025-01-29 RX ADMIN — OXYCODONE 10 MG: 5 TABLET ORAL at 01:35

## 2025-01-29 RX ADMIN — ACETAMINOPHEN 650 MG: 325 TABLET, FILM COATED ORAL at 15:03

## 2025-01-29 RX ADMIN — POLYETHYLENE GLYCOL 3350 17 G: 17 POWDER, FOR SOLUTION ORAL at 09:29

## 2025-01-29 RX ADMIN — OXYCODONE 5 MG: 5 TABLET ORAL at 15:41

## 2025-01-29 RX ADMIN — SENNOSIDES AND DOCUSATE SODIUM 2 TABLET: 50; 8.6 TABLET ORAL at 09:29

## 2025-01-29 RX ADMIN — ACETAMINOPHEN 650 MG: 325 TABLET, FILM COATED ORAL at 21:00

## 2025-01-29 RX ADMIN — ENOXAPARIN SODIUM 40 MG: 100 INJECTION SUBCUTANEOUS at 00:15

## 2025-01-29 RX ADMIN — POLYETHYLENE GLYCOL 3350 17 G: 17 POWDER, FOR SOLUTION ORAL at 21:00

## 2025-01-29 RX ADMIN — SENNOSIDES AND DOCUSATE SODIUM 2 TABLET: 50; 8.6 TABLET ORAL at 21:00

## 2025-01-29 RX ADMIN — ACETAMINOPHEN 650 MG: 325 TABLET, FILM COATED ORAL at 05:45

## 2025-01-29 RX ADMIN — Medication 10 MG: at 21:00

## 2025-01-29 RX ADMIN — OXYCODONE 5 MG: 5 TABLET ORAL at 21:01

## 2025-01-29 RX ADMIN — LIDOCAINE 1 PATCH: 560 PATCH PERCUTANEOUS; TOPICAL; TRANSDERMAL at 09:29

## 2025-01-29 RX ADMIN — BISACODYL 10 MG: 5 TABLET, COATED ORAL at 05:46

## 2025-01-29 RX ADMIN — ACETAMINOPHEN 650 MG: 325 TABLET, FILM COATED ORAL at 00:14

## 2025-01-29 RX ADMIN — CYCLOBENZAPRINE 5 MG: 10 TABLET, FILM COATED ORAL at 21:00

## 2025-01-29 RX ADMIN — OXYCODONE 5 MG: 5 TABLET ORAL at 11:36

## 2025-01-29 RX ADMIN — METOPROLOL SUCCINATE 25 MG: 25 TABLET, FILM COATED, EXTENDED RELEASE ORAL at 21:01

## 2025-01-29 RX ADMIN — ZOLPIDEM TARTRATE 5 MG: 5 TABLET, FILM COATED ORAL at 21:00

## 2025-01-29 RX ADMIN — OXYCODONE 10 MG: 5 TABLET ORAL at 05:49

## 2025-01-29 ASSESSMENT — COGNITIVE AND FUNCTIONAL STATUS - GENERAL
MOVING FROM LYING ON BACK TO SITTING ON SIDE OF FLAT BED WITH BEDRAILS: A LOT
MOVING TO AND FROM BED TO CHAIR: A LOT
MOBILITY SCORE: 10
DRESSING REGULAR LOWER BODY CLOTHING: A LOT
PERSONAL GROOMING: A LITTLE
DAILY ACTIVITIY SCORE: 15
DRESSING REGULAR UPPER BODY CLOTHING: A LOT
CLIMB 3 TO 5 STEPS WITH RAILING: TOTAL
TURNING FROM BACK TO SIDE WHILE IN FLAT BAD: TOTAL
WALKING IN HOSPITAL ROOM: A LOT
STANDING UP FROM CHAIR USING ARMS: A LOT
HELP NEEDED FOR BATHING: A LOT
TOILETING: A LOT

## 2025-01-29 ASSESSMENT — PAIN SCALES - GENERAL
PAINLEVEL_OUTOF10: 6
PAINLEVEL_OUTOF10: 8
PAINLEVEL_OUTOF10: 7
PAINLEVEL_OUTOF10: 6
PAINLEVEL_OUTOF10: 5 - MODERATE PAIN
PAINLEVEL_OUTOF10: 6
PAINLEVEL_OUTOF10: 6
PAINLEVEL_OUTOF10: 8
PAINLEVEL_OUTOF10: 7

## 2025-01-29 ASSESSMENT — PAIN DESCRIPTION - LOCATION
LOCATION: BACK

## 2025-01-29 ASSESSMENT — PAIN DESCRIPTION - ORIENTATION: ORIENTATION: MID

## 2025-01-29 ASSESSMENT — PAIN SCALES - WONG BAKER
WONGBAKER_NUMERICALRESPONSE: HURTS LITTLE MORE
WONGBAKER_NUMERICALRESPONSE: HURTS LITTLE MORE

## 2025-01-29 ASSESSMENT — PAIN DESCRIPTION - DESCRIPTORS: DESCRIPTORS: ACHING

## 2025-01-29 NOTE — PROGRESS NOTES
Occupational Therapy      Occupational Therapy Treatment    Name: Ernie Flores  MRN: [id number]  : 1950  Date: 25  Room: [address] [place], [place] [address]      Time Calculation  Start Time: 905  Stop Time: 921  Time Calculation (min): 16 min    Assessment:  Prognosis: Good  Barriers to Discharge Home: Physical needs, Caregiver assistance  Caregiver Assistance: Patient lives alone and/or does not have reliable caregiver assistance  Physical Needs: 24hr mobility assistance needed, 24hr ADL assistance needed, High falls risk due to function or environment  Evaluation/Treatment Tolerance: Patient limited by pain  End of Session Communication: Bedside nurse  End of Session Patient Position: Up in chair, Alarm on  Plan:  Treatment Interventions: ADL retraining, Functional transfer training, Endurance training, Patient/family training, Equipment evaluation/education, Compensatory technique education  OT Frequency: 3 times per week  OT Discharge Recommendations: High intensity level of continued care  Equipment Recommended upon Discharge:  (TBD)  OT Recommended Transfer Status: Assist of 2  OT - OK to Discharge:  (eval complete)    Subjective   General:  OT Last Visit  OT Received On: 25  Reason for Referral: Lumbar pseudoarthrosis  s/p L3/4, L4/5 prone XLiF, L5/S1 TLIF, L3-S1 extension/revision of fusion  Past Medical History Relevant to Rehab: BPH, cataracts, insomnia, IBS, cauda equina syndrome s/p L4-S1 decompression and fusion in , p/w ASD  Co-Treatment: PT  Co-Treatment Reason: to maximzie pts safety and rehab potential  Prior to Session Communication: Bedside nurse  Patient Position Received: Up in chair, Alarm off, not on at start of session  Family/Caregiver Present: Yes  Caregiver Feedback: Significant other present, receptive and supportive of therapy  General Comment: Pt in chair on arrival, motivated to participate in therapy. Pt beth L LE pain and weakness, no buckling noted but pt noted to be locking L  knee during functional mobility.   Precautions:  Medical Precautions: Fall precautions  Post-Surgical Precautions: Spinal precautions  Vitals:    Lines/Tubes/Drains:  Closed/Suction Drain Right Back Accordion 10 Fr. (Active)   Number of days: 1       Cognition:  Overall Cognitive Status: Within Functional Limits  Orientation Level: Oriented X4    Pain Assessment:  Pain Assessment  Pain Assessment: 0-10  0-10 (Numeric) Pain Score: 6  Pain Type: Surgical pain  Pain Location: Generalized  Pain Interventions: Repositioned     Objective   Activities of Daily Living:               UE Dressing  UE Dressing Level of Assistance: Minimum assistance  UE Dressing Comments: required assistance donning 2nd gown as robe while standing          Functional Standing Tolerance:  Functional Mobility  Functional Mobility Performed: Yes  Functional Mobility 1  Surface 1: Level tile  Device 1: Rolling walker  Assistance 1: Minimum assistance (x2)  Comments 1: Pt participated in functional mobility in room and short distance in hallway using WW. Pt required assistance with safe walker manipualtion and cues to sequence steps.  Bed Mobility/Transfers:   Bed Mobility  Bed Mobility: No (pt in chair pre/post visit, declined to get back into the bed)  Transfers  Transfer: Yes  Transfer 1  Transfer From 1: Sit to  Transfer to 1: Stand  Technique 1: Sit to stand  Transfer Device 1: Walker  Transfer Level of Assistance 1: Minimum assistance, +2  Transfers 2  Transfer From 2: Stand to  Transfer to 2: Sit  Technique 2: Stand to sit  Transfer Device 2: Walker  Transfer Level of Assistance 2: Minimum assistance, +2     Outcome Measures:  [facility] Daily Activity  Putting on and taking off regular lower body clothing: A lot  Bathing (including washing, rinsing, drying): A lot  Putting on and taking off regular upper body clothing: A lot  Toileting, which includes using toilet, bedpan or urinal: A lot  Taking care of personal grooming such as brushing teeth:  A little  Eating Meals: None  Daily Activity - Total Score: 15     Education Documentation  Body Mechanics, taught by Lenka Moscoso OT at 1/29/2025 10:25 AM.  Learner: Patient  Readiness: Acceptance  Method: Explanation, Demonstration  Response: Verbalizes Understanding    Precautions, taught by Lenka Moscoso OT at 1/29/2025 10:25 AM.  Learner: Patient  Readiness: Acceptance  Method: Explanation, Demonstration  Response: Verbalizes Understanding    ADL Training, taught by Lenka Moscoso OT at 1/29/2025 10:25 AM.  Learner: Patient  Readiness: Acceptance  Method: Explanation, Demonstration  Response: Verbalizes Understanding    Body Mechanics, taught by Angelique Asher OT at 1/28/2025 12:03 PM.  Learner: Patient  Readiness: Acceptance  Method: Explanation, Demonstration  Response: Verbalizes Understanding, Demonstrated Understanding, Needs Reinforcement    Precautions, taught by Angelique Asher OT at 1/28/2025 12:03 PM.  Learner: Patient  Readiness: Acceptance  Method: Explanation, Demonstration  Response: Verbalizes Understanding, Demonstrated Understanding, Needs Reinforcement    ADL Training, taught by Angelique Asher OT at 1/28/2025 12:03 PM.  Learner: Patient  Readiness: Acceptance  Method: Explanation, Demonstration  Response: Verbalizes Understanding, Demonstrated Understanding, Needs Reinforcement    Education Comments  No comments found.        Goals:  Encounter Problems       Encounter Problems (Active)       ADLs       Patient with complete lower body dressing with modified independent level of assistance donning and doffing all LE clothes  with PRN adaptive equipment (Progressing)       Start:  01/28/25    Expected End:  02/11/25            Patient will complete daily grooming tasks with modified independent level of assistance and PRN adaptive equipment while standing. (Progressing)       Start:  01/28/25    Expected End:  02/11/25            Patient will complete toileting including  hygiene clothing management/hygiene with independent level of assistance. (Progressing)       Start:  01/28/25    Expected End:  02/11/25               COGNITION/SAFETY       Patient will recall and adhere to spinal precautions during all functional mobility/ADL tasks in order to demonstrate improved understanding and promote healing post op (Progressing)       Start:  01/28/25    Expected End:  02/11/25               MOBILITY       Patient will perform Functional mobility Household distances/Community Distances with modified independent level of assistance and least restrictive device in order to improve safety and functional mobility. (Progressing)       Start:  01/28/25    Expected End:  02/11/25               TRANSFERS       Patient will perform bed mobility modified independent level of assistance in order to improve safety and independence with mobility (Progressing)       Start:  01/28/25    Expected End:  02/11/25            Patient will complete functional transfer to chair/commode with least restrictive device with modified independent level of assistance. (Progressing)       Start:  01/28/25    Expected End:  02/11/25 01/29/25 at 10:26 AM   Lenka Moscoso, OT   [phone]

## 2025-01-29 NOTE — PROGRESS NOTES
Physical Therapy    Physical Therapy Treatment    Patient Name: Ernie Flores  MRN: [id number]  Department: [place] [address]  Room: [facility]  Today's Date: 1/29/2025  Time Calculation  Start Time: 0905  Stop Time: 0921  Time Calculation (min): 16 min         Assessment/Plan   PT Assessment  Physical Needs: 24hr mobility assistance needed, Ambulating household distances limited by function/safety, High falls risk due to function or environment  End of Session Communication: Bedside nurse  Assessment Comment: Patient tolerated session well and demonstrates improved ambulation distance this date but still remains below baseline and requires increased assist for all mobility. continue to rec high intensity  End of Session Patient Position: Up in chair, Alarm on     PT Plan  Treatment/Interventions: Transfer training, Bed mobility, Gait training, Stair training, Balance training, Neuromuscular re-education, Strengthening, Endurance training, Range of motion, Therapeutic activity, Therapeutic exercise  PT Plan: Ongoing PT  PT Frequency: Daily  PT Discharge Recommendations: High intensity level of continued care  Equipment Recommended upon Discharge:  (tbd)  PT Recommended Transfer Status: Assist x2, Assistive device  PT - OK to Discharge: Yes (indicates PT eval complete and dc rec determined)      General Visit Information:   PT  Visit  PT Received On: 01/29/25  Response to Previous Treatment: Patient with no complaints from previous session.  General  Co-Treatment: OT  Co-Treatment Reason: maximize pt safety and function, [facility] 10  Prior to Session Communication: Bedside nurse  Patient Position Received: Up in chair, Alarm off, not on at start of session  General Comment: pt sitting in chair, RN cleared. cooperative    Subjective   Precautions:  Precautions  Medical Precautions: Fall precautions  Post-Surgical Precautions: Spinal precautions    Objective   Pain:  Pain Assessment  Pain Assessment: 0-10  0-10 (Numeric) Pain  "Score: 6  Pain Type: Surgical pain  Pain Location:  (\"everywhere\")  Pain Interventions: Repositioned, Ambulation/increased activity  Cognition:  Cognition  Overall Cognitive Status: Within Functional Limits  Orientation Level: Oriented X4    Activity Tolerance:  Activity Tolerance  Endurance: Tolerates 10 - 20 min exercise with multiple rests  Treatments:  Therapeutic Exercise  Therapeutic Exercise Performed: Yes  Therapeutic Exercise Activity 1: rosa maria pumps in chair x5    Therapeutic Activity  Therapeutic Activity Performed: Yes  Therapeutic Activity 1: pt stood and ambulated increased distance compared to yesterday    Bed Mobility  Bed Mobility: No (no, pt in chair)    Ambulation/Gait Training  Ambulation/Gait Training Performed: Yes  Ambulation/Gait Training 1  Surface 1: Level tile  Device 1: Rolling walker  Assistance 1: Minimum assistance (x2; progressing to mod A x2 with fatigue/)  Quality of Gait 1: Shuffling gait, Decreased step length, Diminished heel strike  Comments/Distance (ft) 1: LLE locked out each step, ambulated 10' x2.  Transfers  Transfer: Yes  Transfer 1  Transfer From 1: Sit to, Stand to  Transfer to 1: Stand, Sit  Technique 1: Sit to stand, Stand to sit  Transfer Device 1: Walker  Transfer Level of Assistance 1: Minimum assistance, +2  Trials/Comments 1: stood from EOB, cues for hand placement    Outcome Measures:  [facility] Basic Mobility  Turning from your back to your side while in a flat bed without using bedrails: A lot  Moving from lying on your back to sitting on the side of a flat bed without using bedrails: Total  Moving to and from bed to chair (including a wheelchair): A lot  Standing up from a chair using your arms (e.g. wheelchair or bedside chair): A lot  To walk in hospital room: A lot  Climbing 3-5 steps with railing: Total  Basic Mobility - Total Score: 10    Education Documentation  Precautions, taught by Chhaya Cameron PT at 1/29/2025 10:04 AM.  Learner: Patient  Readiness: " Acceptance  Method: Explanation  Response: Verbalizes Understanding  Comment: edu on role of PT, dc plan and safety    Mobility Training, taught by Chhaya Cameron PT at 1/29/2025 10:04 AM.  Learner: Patient  Readiness: Acceptance  Method: Explanation  Response: Verbalizes Understanding  Comment: edu on role of PT, dc plan and safety    Education Comments  No comments found.        OP EDUCATION:       Encounter Problems       Encounter Problems (Active)       Mobility       STG - Patient will ambulate > 100' with LRAD CGA (Progressing)       Start:  01/28/25    Expected End:  02/11/25            STG - Patient will ascend and descend four steps, if dc to SO home, CGA (Progressing)       Start:  01/28/25    Expected End:  02/11/25               PT Transfers       STG - Transfer from bed to chair LRAD CGA (Progressing)       Start:  01/28/25    Expected End:  02/11/25            STG - Patient to transfer to and from sit to supine with log roll, CGA (Progressing)       Start:  01/28/25    Expected End:  02/11/25            STG - Patient will roll SBA (Progressing)       Start:  01/28/25    Expected End:  02/11/25            STG - Patient will transfer sit to and from stand LRAD CGA (Progressing)       Start:  01/28/25    Expected End:  02/11/25               Pain - Adult

## 2025-01-29 NOTE — CARE PLAN
The patient's goals for the shift include      The clinical goals for the shift include pt will remain  safe and have pain managed throughout my shift      Problem: Discharge Planning  Goal: Discharge to home or other facility with appropriate resources  Outcome: Progressing

## 2025-01-30 LAB
ANION GAP SERPL CALC-SCNC: 12 MMOL/L (ref 10–20)
BASOPHILS # BLD MANUAL: 0 X10*3/UL (ref 0–0.1)
BASOPHILS NFR BLD MANUAL: 0 %
BUN SERPL-MCNC: 14 MG/DL (ref 6–23)
CALCIUM SERPL-MCNC: 8.4 MG/DL (ref 8.6–10.6)
CHLORIDE SERPL-SCNC: 99 MMOL/L (ref 98–107)
CO2 SERPL-SCNC: 29 MMOL/L (ref 21–32)
CREAT SERPL-MCNC: 0.82 MG/DL (ref 0.5–1.3)
EGFRCR SERPLBLD CKD-EPI 2021: >90 ML/MIN/1.73M*2
EOSINOPHIL # BLD MANUAL: 0 X10*3/UL (ref 0–0.4)
EOSINOPHIL NFR BLD MANUAL: 0 %
ERYTHROCYTE [DISTWIDTH] IN BLOOD BY AUTOMATED COUNT: 12.8 % (ref 11.5–14.5)
GLUCOSE SERPL-MCNC: 132 MG/DL (ref 74–99)
HCT VFR BLD AUTO: 29.2 % (ref 41–52)
HGB BLD-MCNC: 10.2 G/DL (ref 13.5–17.5)
IMM GRANULOCYTES # BLD AUTO: 0.11 X10*3/UL (ref 0–0.5)
IMM GRANULOCYTES NFR BLD AUTO: 0.7 % (ref 0–0.9)
LYMPHOCYTES # BLD MANUAL: 0 X10*3/UL (ref 0.8–3)
LYMPHOCYTES NFR BLD MANUAL: 0 %
MCH RBC QN AUTO: 32.4 PG (ref 26–34)
MCHC RBC AUTO-ENTMCNC: 34.9 G/DL (ref 32–36)
MCV RBC AUTO: 93 FL (ref 80–100)
MONOCYTES # BLD MANUAL: 0.57 X10*3/UL (ref 0.05–0.8)
MONOCYTES NFR BLD MANUAL: 3.5 %
NEUTS SEG # BLD MANUAL: 15.73 X10*3/UL (ref 1.6–5)
NEUTS SEG NFR BLD MANUAL: 96.5 %
NRBC BLD-RTO: 0 /100 WBCS (ref 0–0)
PLATELET # BLD AUTO: 186 X10*3/UL (ref 150–450)
POTASSIUM SERPL-SCNC: 3.7 MMOL/L (ref 3.5–5.3)
RBC # BLD AUTO: 3.15 X10*6/UL (ref 4.5–5.9)
RBC MORPH BLD: ABNORMAL
SODIUM SERPL-SCNC: 136 MMOL/L (ref 136–145)
TOTAL CELLS COUNTED BLD: 115
WBC # BLD AUTO: 16.3 X10*3/UL (ref 4.4–11.3)

## 2025-01-30 PROCEDURE — 2500000001 HC RX 250 WO HCPCS SELF ADMINISTERED DRUGS (ALT 637 FOR MEDICARE OP)

## 2025-01-30 PROCEDURE — 97530 THERAPEUTIC ACTIVITIES: CPT | Mod: GP

## 2025-01-30 PROCEDURE — 85027 COMPLETE CBC AUTOMATED: CPT

## 2025-01-30 PROCEDURE — 85007 BL SMEAR W/DIFF WBC COUNT: CPT

## 2025-01-30 PROCEDURE — 2500000001 HC RX 250 WO HCPCS SELF ADMINISTERED DRUGS (ALT 637 FOR MEDICARE OP): Performed by: NEUROLOGICAL SURGERY

## 2025-01-30 PROCEDURE — 2500000004 HC RX 250 GENERAL PHARMACY W/ HCPCS (ALT 636 FOR OP/ED): Performed by: NEUROLOGICAL SURGERY

## 2025-01-30 PROCEDURE — 2500000002 HC RX 250 W HCPCS SELF ADMINISTERED DRUGS (ALT 637 FOR MEDICARE OP, ALT 636 FOR OP/ED): Performed by: PHYSICIAN ASSISTANT

## 2025-01-30 PROCEDURE — 2500000005 HC RX 250 GENERAL PHARMACY W/O HCPCS

## 2025-01-30 PROCEDURE — 82374 ASSAY BLOOD CARBON DIOXIDE: CPT | Performed by: NEUROLOGICAL SURGERY

## 2025-01-30 PROCEDURE — 2500000001 HC RX 250 WO HCPCS SELF ADMINISTERED DRUGS (ALT 637 FOR MEDICARE OP): Performed by: PHYSICIAN ASSISTANT

## 2025-01-30 PROCEDURE — 2500000002 HC RX 250 W HCPCS SELF ADMINISTERED DRUGS (ALT 637 FOR MEDICARE OP, ALT 636 FOR OP/ED): Performed by: NEUROLOGICAL SURGERY

## 2025-01-30 PROCEDURE — 1100000001 HC PRIVATE ROOM DAILY

## 2025-01-30 PROCEDURE — 36415 COLL VENOUS BLD VENIPUNCTURE: CPT

## 2025-01-30 RX ORDER — ACETAMINOPHEN 325 MG/1
650 TABLET ORAL EVERY 6 HOURS PRN
Status: CANCELLED
Start: 2025-01-30

## 2025-01-30 RX ORDER — BISACODYL 5 MG
10 TABLET, DELAYED RELEASE (ENTERIC COATED) ORAL DAILY PRN
Status: DISCONTINUED | OUTPATIENT
Start: 2025-01-30 | End: 2025-02-01 | Stop reason: HOSPADM

## 2025-01-30 RX ORDER — ADHESIVE BANDAGE
30 BANDAGE TOPICAL DAILY PRN
Status: DISCONTINUED | OUTPATIENT
Start: 2025-01-30 | End: 2025-02-01 | Stop reason: HOSPADM

## 2025-01-30 RX ORDER — SODIUM CHLORIDE 9 MG/ML
50 INJECTION, SOLUTION INTRAVENOUS CONTINUOUS
Status: DISCONTINUED | OUTPATIENT
Start: 2025-01-30 | End: 2025-01-31

## 2025-01-30 RX ORDER — BACITRACIN 500 [USP'U]/G
OINTMENT TOPICAL 3 TIMES DAILY PRN
Status: DISCONTINUED | OUTPATIENT
Start: 2025-01-30 | End: 2025-02-01 | Stop reason: HOSPADM

## 2025-01-30 RX ORDER — BISACODYL 10 MG/1
10 SUPPOSITORY RECTAL DAILY
Status: DISCONTINUED | OUTPATIENT
Start: 2025-01-30 | End: 2025-02-01 | Stop reason: HOSPADM

## 2025-01-30 RX ORDER — BISACODYL 5 MG
10 TABLET, DELAYED RELEASE (ENTERIC COATED) ORAL DAILY
Status: CANCELLED
Start: 2025-01-30

## 2025-01-30 RX ORDER — PROMETHAZINE HYDROCHLORIDE 25 MG/1
12.5 TABLET ORAL EVERY 6 HOURS PRN
Status: DISCONTINUED | OUTPATIENT
Start: 2025-01-30 | End: 2025-02-01 | Stop reason: HOSPADM

## 2025-01-30 RX ORDER — ACETAMINOPHEN 325 MG/1
650 TABLET ORAL EVERY 6 HOURS PRN
Status: DISCONTINUED | OUTPATIENT
Start: 2025-01-30 | End: 2025-02-01 | Stop reason: HOSPADM

## 2025-01-30 RX ORDER — PANTOPRAZOLE SODIUM 40 MG/1
40 TABLET, DELAYED RELEASE ORAL
Status: DISCONTINUED | OUTPATIENT
Start: 2025-01-31 | End: 2025-02-01 | Stop reason: HOSPADM

## 2025-01-30 RX ORDER — ACETAMINOPHEN 325 MG/1
650 TABLET ORAL EVERY 6 HOURS
Status: CANCELLED
Start: 2025-01-30

## 2025-01-30 RX ADMIN — ACETAMINOPHEN 650 MG: 325 TABLET, FILM COATED ORAL at 03:26

## 2025-01-30 RX ADMIN — ACETAMINOPHEN 650 MG: 325 TABLET, FILM COATED ORAL at 09:54

## 2025-01-30 RX ADMIN — CYCLOBENZAPRINE 5 MG: 10 TABLET, FILM COATED ORAL at 20:58

## 2025-01-30 RX ADMIN — BISACODYL 10 MG: 5 TABLET, COATED ORAL at 06:12

## 2025-01-30 RX ADMIN — LIDOCAINE 1 PATCH: 560 PATCH PERCUTANEOUS; TOPICAL; TRANSDERMAL at 09:54

## 2025-01-30 RX ADMIN — Medication 10 MG: at 20:52

## 2025-01-30 RX ADMIN — PROMETHAZINE HYDROCHLORIDE 12.5 MG: 25 TABLET ORAL at 20:02

## 2025-01-30 RX ADMIN — PROMETHAZINE HYDROCHLORIDE 12.5 MG: 25 TABLET ORAL at 13:53

## 2025-01-30 RX ADMIN — POLYETHYLENE GLYCOL 3350 17 G: 17 POWDER, FOR SOLUTION ORAL at 09:54

## 2025-01-30 RX ADMIN — ENOXAPARIN SODIUM 40 MG: 100 INJECTION SUBCUTANEOUS at 01:30

## 2025-01-30 RX ADMIN — SENNOSIDES AND DOCUSATE SODIUM 2 TABLET: 50; 8.6 TABLET ORAL at 09:54

## 2025-01-30 RX ADMIN — ZOLPIDEM TARTRATE 5 MG: 5 TABLET, FILM COATED ORAL at 20:52

## 2025-01-30 RX ADMIN — ACETAMINOPHEN 650 MG: 325 TABLET ORAL at 17:38

## 2025-01-30 RX ADMIN — ENOXAPARIN SODIUM 40 MG: 100 INJECTION SUBCUTANEOUS at 23:59

## 2025-01-30 ASSESSMENT — PAIN SCALES - GENERAL
PAINLEVEL_OUTOF10: 8
PAINLEVEL_OUTOF10: 6
PAINLEVEL_OUTOF10: 7
PAINLEVEL_OUTOF10: 8
PAINLEVEL_OUTOF10: 6
PAINLEVEL_OUTOF10: 8

## 2025-01-30 ASSESSMENT — COGNITIVE AND FUNCTIONAL STATUS - GENERAL
MOVING TO AND FROM BED TO CHAIR: A LITTLE
CLIMB 3 TO 5 STEPS WITH RAILING: TOTAL
WALKING IN HOSPITAL ROOM: A LITTLE
TURNING FROM BACK TO SIDE WHILE IN FLAT BAD: A LOT
MOVING FROM LYING ON BACK TO SITTING ON SIDE OF FLAT BED WITH BEDRAILS: A LOT
STANDING UP FROM CHAIR USING ARMS: A LITTLE
MOBILITY SCORE: 14

## 2025-01-30 ASSESSMENT — PAIN - FUNCTIONAL ASSESSMENT
PAIN_FUNCTIONAL_ASSESSMENT: 0-10

## 2025-01-30 ASSESSMENT — PAIN DESCRIPTION - DESCRIPTORS: DESCRIPTORS: DULL

## 2025-01-30 ASSESSMENT — PAIN DESCRIPTION - LOCATION
LOCATION: BACK
LOCATION: BACK

## 2025-01-30 NOTE — PROGRESS NOTES
"Physical Therapy    Physical Therapy Treatment    Patient Name: Ernie Flores  MRN: [id number]  Department: [place] [address]  Room: [address] [place] [address]  Today's Date: 1/30/2025  Time Calculation  Start Time: 1438  Stop Time: 1451  Time Calculation (min): 13 min         Assessment/Plan   PT Assessment  End of Session Communication: Bedside nurse  Assessment Comment: Patient tolerated session well, progressing ambulation distance this date but continues to have LLE weakness and fatigue. continue to rec high  End of Session Patient Position: Up in chair, Alarm on     PT Plan  Treatment/Interventions: Transfer training, Bed mobility, Gait training, Stair training, Balance training, Neuromuscular re-education, Strengthening, Endurance training, Range of motion, Therapeutic activity, Therapeutic exercise  PT Plan: Ongoing PT  PT Frequency: Daily  PT Discharge Recommendations: High intensity level of continued care  Equipment Recommended upon Discharge:  (tbd)  PT Recommended Transfer Status: Assist x2, Assistive device  PT - OK to Discharge: Yes (indicates PT eval complete and dc rec determined)      General Visit Information:   PT  Visit  PT Received On: 01/30/25  Response to Previous Treatment: Patient with no complaints from previous session.  General  Missed Visit Reason: Other (Comment)  Prior to Session Communication: Bedside nurse  Patient Position Received: Bed, 3 rail up, Alarm on  General Comment: pt supine in bed, RN cleared. agreeable to therapy    Subjective   Precautions:  Precautions  Medical Precautions: Fall precautions  Post-Surgical Precautions: Spinal precautions     Date/Time Vitals Session Patient Position Pulse Resp SpO2 BP MAP (mmHg)    01/30/25 1430 --  --  96  18  --  136/70  98           Objective   Pain:  Pain Assessment  Pain Assessment: 0-10  0-10 (Numeric) Pain Score: 8  Pain Type: Acute pain  Pain Location:  (\"everywhere\")  Cognition:  Cognition  Overall Cognitive Status: Within Functional " Limits  Orientation Level: Oriented X4    Activity Tolerance:  Activity Tolerance  Endurance: Tolerates 10 - 20 min exercise with multiple rests  Treatments:  Therapeutic Activity  Therapeutic Activity Performed: Yes  Therapeutic Activity 1: pt completed bed mobility, stood from EOB, transferred to [facility]. Patient stood from [facility] and ambulated in [place].    Bed Mobility  Bed Mobility: Yes  Bed Mobility 1  Bed Mobility 1: Supine to sitting  Level of Assistance 1: Moderate assistance  Bed Mobility Comments 1: cues for log roll needed but pt self directive in completion of bed mobility. assist with trunk    Ambulation/Gait Training  Ambulation/Gait Training Performed: Yes  Ambulation/Gait Training 1  Surface 1: Level tile  Device 1: Rolling walker  Assistance 1: Minimum assistance  Quality of Gait 1: Shuffling gait, Decreased step length, Diminished heel strike  Comments/Distance (ft) 1: LLE locked in extension, ambulated 3' to [facility]. then 25' x2 with standing rest breaks  Transfers  Transfer: Yes  Transfer 1  Transfer From 1: Sit to, Stand to  Transfer to 1: Stand, Sit  Technique 1: Sit to stand, Stand to sit  Transfer Device 1: Walker  Transfer Level of Assistance 1: Minimum assistance  Trials/Comments 1: stood from EOB and [facility]    Outcome Measures:  [facility] Basic Mobility  Turning from your back to your side while in a flat bed without using bedrails: A lot  Moving from lying on your back to sitting on the side of a flat bed without using bedrails: A lot  Moving to and from bed to chair (including a wheelchair): A little  Standing up from a chair using your arms (e.g. wheelchair or bedside chair): A little  To walk in hospital room: A little  Climbing 3-5 steps with railing: Total  Basic Mobility - Total Score: 14    Education Documentation  Precautions, taught by Chhaya Cameron, PT at 1/30/2025  3:27 PM.  Learner: Patient  Readiness: Acceptance  Method: Explanation  Response: Verbalizes Understanding  Comment:  edu on current mobility status    Mobility Training, taught by Chhaya Cameron, PT at 1/30/2025  3:27 PM.  Learner: Patient  Readiness: Acceptance  Method: Explanation  Response: Verbalizes Understanding  Comment: edu on current mobility status    Education Comments  No comments found.        OP EDUCATION:       Encounter Problems       Encounter Problems (Active)       Mobility       STG - Patient will ambulate > 100' with LRAD CGA (Progressing)       Start:  01/28/25    Expected End:  02/11/25            STG - Patient will ascend and descend four steps, if dc to SO home, CGA (Progressing)       Start:  01/28/25    Expected End:  02/11/25               PT Transfers       STG - Transfer from bed to chair LRAD CGA (Progressing)       Start:  01/28/25    Expected End:  02/11/25            STG - Patient to transfer to and from sit to supine with log roll, CGA (Progressing)       Start:  01/28/25    Expected End:  02/11/25            STG - Patient will roll SBA (Progressing)       Start:  01/28/25    Expected End:  02/11/25            STG - Patient will transfer sit to and from stand LRAD CGA (Progressing)       Start:  01/28/25    Expected End:  02/11/25               Pain - Adult

## 2025-01-30 NOTE — PROGRESS NOTES
Physical Therapy                 Therapy Communication Note    Patient Name: Ernie Flores  MRN: [id number]  Department: [place] [address]  Room: [facility]  Today's Date: 1/30/2025     Discipline: Physical Therapy          Missed Visit Reason: Missed Visit Reason: Other (Comment)    Missed Time: Attempt    Comment:   PT treatment attempted; pt laying in bed, significant other in room. Patient declines PT at this time, reports he just got back to bed after using commode for 30 min. Reports food tray arrived. Encouraged mobility and OOB to chair for meal but pt reports he'd like to rest at this time. Will re-attempt at later time/date as schedule permits.

## 2025-01-30 NOTE — CARE PLAN
The patient's goals for the shift include      The clinical goals for the shift include pt will remain free for injury.    Over the shift, the patient did not make progress toward the following goals. Barriers to progression include . Recommendations to address these barriers include .     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

## 2025-01-30 NOTE — PROGRESS NOTES
"Ernie Flores is a 74 y.o. male on day 3 of admission presenting with Lumbar pseudoarthrosis.    Subjective   Had some cough and congestion yesterday, drain disconnected and therefore dc'd. Otherwise pain controlled    Objective     Physical Exam  A&Ox3  Face symmetric  RUE 5/5  LUE 5/5  RLE DF4 EHL 0 (baseline)  LLE HF 4+ o/w 5/5  Sensation intact to light touch throughout all extremities  Incisions c/d/i    Last Recorded Vitals  Blood pressure 118/74, pulse 96, temperature 36.7 °C (98.1 °F), resp. rate 16, height 1.753 m (5' 9\"), weight 98.1 kg (216 lb 3.2 oz), SpO2 96%.  Intake/Output last 3 Shifts:  I/O last 3 completed shifts:  In: 0 (0 mL/kg)   Out: 1135 (11.6 mL/kg) [Urine:1050 (0.3 mL/kg/hr); Drains:85]  Weight: 98.1 kg     Relevant Results    Assessment/Plan   Assessment & Plan  Lumbar pseudoarthrosis    Spondylolisthesis of lumbar region    Bilateral lumbar radiculopathy    Patient is a 74 year old male w/ h/o BPH, cataracts, insomnia, IBS, cauda equina syndrome s/p L4-S1 decompression and fusion in 2022, p/w ASD    1/27 s/p L3/4, L4/5 prone XLiF, L5/S1 TLIF, L3-S1 extension/revision of fusion  1/29 drain dc'd    PLAN  Floor  AM labs, will trend white count  Encouraged incentive spirometer  EOS when able  BM  PTOT-rehab  SCDs, LVX (ppx)    Adama Thomas MD      "

## 2025-01-31 ENCOUNTER — APPOINTMENT (OUTPATIENT)
Dept: RADIOLOGY | Facility: HOSPITAL | Age: 75
End: 2025-01-31
Payer: MEDICARE

## 2025-01-31 VITALS
WEIGHT: 216.2 LBS | BODY MASS INDEX: 32.02 KG/M2 | RESPIRATION RATE: 16 BRPM | TEMPERATURE: 98.1 F | DIASTOLIC BLOOD PRESSURE: 61 MMHG | HEIGHT: 69 IN | OXYGEN SATURATION: 97 % | HEART RATE: 113 BPM | SYSTOLIC BLOOD PRESSURE: 102 MMHG

## 2025-01-31 LAB
ANION GAP SERPL CALC-SCNC: 13 MMOL/L (ref 10–20)
BASOPHILS # BLD AUTO: 0.04 X10*3/UL (ref 0–0.1)
BASOPHILS NFR BLD AUTO: 0.3 %
BUN SERPL-MCNC: 16 MG/DL (ref 6–23)
CALCIUM SERPL-MCNC: 8.6 MG/DL (ref 8.6–10.6)
CHLORIDE SERPL-SCNC: 102 MMOL/L (ref 98–107)
CO2 SERPL-SCNC: 28 MMOL/L (ref 21–32)
CREAT SERPL-MCNC: 0.96 MG/DL (ref 0.5–1.3)
EGFRCR SERPLBLD CKD-EPI 2021: 82 ML/MIN/1.73M*2
EOSINOPHIL # BLD AUTO: 0.03 X10*3/UL (ref 0–0.4)
EOSINOPHIL NFR BLD AUTO: 0.3 %
ERYTHROCYTE [DISTWIDTH] IN BLOOD BY AUTOMATED COUNT: 13 % (ref 11.5–14.5)
GLUCOSE SERPL-MCNC: 99 MG/DL (ref 74–99)
HCT VFR BLD AUTO: 32.4 % (ref 41–52)
HGB BLD-MCNC: 10.6 G/DL (ref 13.5–17.5)
IMM GRANULOCYTES # BLD AUTO: 0.1 X10*3/UL (ref 0–0.5)
IMM GRANULOCYTES NFR BLD AUTO: 0.8 % (ref 0–0.9)
LYMPHOCYTES # BLD AUTO: 0.99 X10*3/UL (ref 0.8–3)
LYMPHOCYTES NFR BLD AUTO: 8.3 %
MCH RBC QN AUTO: 31.4 PG (ref 26–34)
MCHC RBC AUTO-ENTMCNC: 32.7 G/DL (ref 32–36)
MCV RBC AUTO: 96 FL (ref 80–100)
MONOCYTES # BLD AUTO: 0.91 X10*3/UL (ref 0.05–0.8)
MONOCYTES NFR BLD AUTO: 7.7 %
NEUTROPHILS # BLD AUTO: 9.82 X10*3/UL (ref 1.6–5.5)
NEUTROPHILS NFR BLD AUTO: 82.6 %
NRBC BLD-RTO: 0 /100 WBCS (ref 0–0)
PLATELET # BLD AUTO: 194 X10*3/UL (ref 150–450)
POTASSIUM SERPL-SCNC: 3.3 MMOL/L (ref 3.5–5.3)
RBC # BLD AUTO: 3.38 X10*6/UL (ref 4.5–5.9)
SODIUM SERPL-SCNC: 140 MMOL/L (ref 136–145)
WBC # BLD AUTO: 11.9 X10*3/UL (ref 4.4–11.3)

## 2025-01-31 PROCEDURE — 2500000005 HC RX 250 GENERAL PHARMACY W/O HCPCS

## 2025-01-31 PROCEDURE — 72082 X-RAY EXAM ENTIRE SPI 2/3 VW: CPT | Performed by: RADIOLOGY

## 2025-01-31 PROCEDURE — 2500000002 HC RX 250 W HCPCS SELF ADMINISTERED DRUGS (ALT 637 FOR MEDICARE OP, ALT 636 FOR OP/ED): Performed by: PHYSICIAN ASSISTANT

## 2025-01-31 PROCEDURE — 36415 COLL VENOUS BLD VENIPUNCTURE: CPT | Performed by: PHYSICIAN ASSISTANT

## 2025-01-31 PROCEDURE — 72082 X-RAY EXAM ENTIRE SPI 2/3 VW: CPT

## 2025-01-31 PROCEDURE — 2500000004 HC RX 250 GENERAL PHARMACY W/ HCPCS (ALT 636 FOR OP/ED): Performed by: NEUROLOGICAL SURGERY

## 2025-01-31 PROCEDURE — 2500000001 HC RX 250 WO HCPCS SELF ADMINISTERED DRUGS (ALT 637 FOR MEDICARE OP): Performed by: NEUROLOGICAL SURGERY

## 2025-01-31 PROCEDURE — 2500000001 HC RX 250 WO HCPCS SELF ADMINISTERED DRUGS (ALT 637 FOR MEDICARE OP): Performed by: PHYSICIAN ASSISTANT

## 2025-01-31 PROCEDURE — 85025 COMPLETE CBC W/AUTO DIFF WBC: CPT

## 2025-01-31 PROCEDURE — 82374 ASSAY BLOOD CARBON DIOXIDE: CPT | Performed by: PHYSICIAN ASSISTANT

## 2025-01-31 PROCEDURE — 99239 HOSP IP/OBS DSCHRG MGMT >30: CPT | Performed by: PHYSICIAN ASSISTANT

## 2025-01-31 RX ORDER — ENOXAPARIN SODIUM 100 MG/ML
40 INJECTION SUBCUTANEOUS EVERY 24 HOURS
Start: 2025-01-31

## 2025-01-31 RX ORDER — CYCLOBENZAPRINE HCL 5 MG
5 TABLET ORAL 3 TIMES DAILY
Start: 2025-01-31

## 2025-01-31 RX ORDER — POTASSIUM CHLORIDE 20 MEQ/1
40 TABLET, EXTENDED RELEASE ORAL ONCE
Status: COMPLETED | OUTPATIENT
Start: 2025-01-31 | End: 2025-01-31

## 2025-01-31 RX ORDER — IBUPROFEN 200 MG
200 TABLET ORAL EVERY 8 HOURS PRN
Start: 2025-01-31

## 2025-01-31 RX ORDER — POLYETHYLENE GLYCOL 3350 17 G/17G
17 POWDER, FOR SOLUTION ORAL 2 TIMES DAILY
Start: 2025-01-31

## 2025-01-31 RX ORDER — ACETAMINOPHEN 325 MG/1
650 TABLET ORAL EVERY 6 HOURS PRN
Start: 2025-01-31

## 2025-01-31 RX ORDER — LIDOCAINE 560 MG/1
1 PATCH PERCUTANEOUS; TOPICAL; TRANSDERMAL DAILY
Start: 2025-01-31

## 2025-01-31 RX ORDER — OXYCODONE HYDROCHLORIDE 5 MG/1
5 TABLET ORAL EVERY 6 HOURS PRN
Start: 2025-01-31

## 2025-01-31 RX ORDER — AMOXICILLIN 250 MG
2 CAPSULE ORAL 2 TIMES DAILY
Start: 2025-01-31

## 2025-01-31 RX ORDER — PANTOPRAZOLE SODIUM 40 MG/1
40 TABLET, DELAYED RELEASE ORAL
Start: 2025-01-31

## 2025-01-31 RX ADMIN — PANTOPRAZOLE SODIUM 40 MG: 40 TABLET, DELAYED RELEASE ORAL at 06:31

## 2025-01-31 RX ADMIN — OXYCODONE 5 MG: 5 TABLET ORAL at 15:09

## 2025-01-31 RX ADMIN — OXYCODONE 5 MG: 5 TABLET ORAL at 04:38

## 2025-01-31 RX ADMIN — CYCLOBENZAPRINE 5 MG: 10 TABLET, FILM COATED ORAL at 20:20

## 2025-01-31 RX ADMIN — POTASSIUM CHLORIDE 40 MEQ: 1500 TABLET, EXTENDED RELEASE ORAL at 11:26

## 2025-01-31 RX ADMIN — LIDOCAINE 1 PATCH: 560 PATCH PERCUTANEOUS; TOPICAL; TRANSDERMAL at 09:32

## 2025-01-31 RX ADMIN — ACETAMINOPHEN 650 MG: 325 TABLET ORAL at 09:32

## 2025-01-31 RX ADMIN — OXYCODONE 5 MG: 5 TABLET ORAL at 09:32

## 2025-01-31 RX ADMIN — OXYCODONE 5 MG: 5 TABLET ORAL at 19:19

## 2025-01-31 RX ADMIN — ACETAMINOPHEN 650 MG: 325 TABLET ORAL at 19:19

## 2025-01-31 RX ADMIN — POLYETHYLENE GLYCOL 3350 17 G: 17 POWDER, FOR SOLUTION ORAL at 20:20

## 2025-01-31 RX ADMIN — PROMETHAZINE HYDROCHLORIDE 12.5 MG: 25 TABLET ORAL at 15:09

## 2025-01-31 ASSESSMENT — COGNITIVE AND FUNCTIONAL STATUS - GENERAL
PERSONAL GROOMING: A LITTLE
MOVING TO AND FROM BED TO CHAIR: A LITTLE
HELP NEEDED FOR BATHING: A LITTLE
DRESSING REGULAR UPPER BODY CLOTHING: A LOT
DRESSING REGULAR LOWER BODY CLOTHING: A LITTLE
STANDING UP FROM CHAIR USING ARMS: A LITTLE
WALKING IN HOSPITAL ROOM: A LITTLE
TURNING FROM BACK TO SIDE WHILE IN FLAT BAD: A LITTLE
MOVING FROM LYING ON BACK TO SITTING ON SIDE OF FLAT BED WITH BEDRAILS: A LITTLE
DAILY ACTIVITIY SCORE: 17
TOILETING: A LOT
MOBILITY SCORE: 18
CLIMB 3 TO 5 STEPS WITH RAILING: A LITTLE

## 2025-01-31 ASSESSMENT — PAIN - FUNCTIONAL ASSESSMENT
PAIN_FUNCTIONAL_ASSESSMENT: 0-10

## 2025-01-31 ASSESSMENT — PAIN SCALES - GENERAL
PAINLEVEL_OUTOF10: 7
PAINLEVEL_OUTOF10: 5 - MODERATE PAIN
PAINLEVEL_OUTOF10: 7

## 2025-01-31 NOTE — CARE PLAN
The patient's goals for the shift include      The clinical goals for the shift include The patient's pain will be under control by end of shift    Over the shift, the patient did not make progress toward the following goals. Barriers to progression include . Recommendations to address these barriers include .

## 2025-01-31 NOTE — NURSING NOTE
AVS instructions given to [facility] transport team. IV out. Vitals are stable. All patient questions answered. Patient informed on where he will be transported for rehab. Wife of patient has been updated on status of discharge. [facility].

## 2025-01-31 NOTE — PROGRESS NOTES
Patient is medically cleared for discharge today accepted at [facility] patient, spouse, team and bedside nurse are aware. [facility] transportation slips have been given to the bedside nurse and unit secretary will be transported by [facility] via stretcher set to arrive at 1630 pm. I will follow until discharged.

## 2025-01-31 NOTE — DISCHARGE SUMMARY
Discharge Diagnosis  Lumbar pseudoarthrosis    Test Results Pending At Discharge  Pending Labs       No current pending labs.          Hospital Course  Ernie Flores is a 74 y.o. male with a past medical history of BPH, cataracts, insomnia, IBS and cauda equina syndrome s/p L4-S1 decompression and fusion in 2022. He presented to clinic with ASD and was then admitted to Neurosurgery for surgical management.    1/27 s/p L3/4, L4/5 prone XLIF, L5/S1 TLIF, L3-S1 extension/revision of fusion.   1/29 Drain removed.     PT/OT evaluated patient and recommended acute rehab placement at discharge.     On the day of discharge, the patient was seen and evaluated by the neurosurgery team and deemed suitable for discharge. The patient was given detailed discharge instructions and were scheduled to follow up as an outpatient.    Pertinent Physical Exam At Time of Discharge  Constitutional: A&Ox3, calm and cooperative, NAD.  Eyes: PERRL, EOMI.   ENMT: Moist mucous membranes, no apparent injuries or lesions.  Cardiovascular: Normal rate and regular rhythm. 2+ equal pulses of the distal extremities.  Respiratory/Thorax: CTAB, regular respirations on RA. Good symmetric chest expansion.   Gastrointestinal: Abdomen soft, non tender.   Neurological:   A&Ox3  Face symmetric, Facial SILT   Tongue midline and symmetric  RUE 5/5  LUE 5/5  RLE DF4 EHL 0 (baseline)  LLE HF 4+ o/w 5/5  Sensation intact to light touch throughout all extremities  Psychological: Appropriate mood and behavior.   Skin: Warm and dry. Spinal incision C/D/I.     Home Medications     Medication List      START taking these medications     acetaminophen 325 mg tablet; Commonly known as: Tylenol; Take 2 tablets   (650 mg) by mouth every 6 hours if needed for mild pain (1 - 3).   cyclobenzaprine 5 mg tablet; Commonly known as: Flexeril; Take 1 tablet   (5 mg) by mouth 3 times a day.   enoxaparin 40 mg/0.4 mL syringe; Commonly known as: Lovenox; Inject 0.4   mL (40 mg)  under the skin once every 24 hours. Until increased ambulation   lidocaine 4 % patch; Place 1 patch over 12 hours on the skin once daily.   Remove & discard patch within 12 hours or as directed by MD.   oxyCODONE 5 mg immediate release tablet; Commonly known as: Roxicodone;   Take 1 tablet (5 mg) by mouth every 6 hours if needed for severe pain (7 -   10).   pantoprazole 40 mg EC tablet; Commonly known as: ProtoNix; Take 1 tablet   (40 mg) by mouth once daily in the morning. Take before meals. Do not   crush, chew, or split.   polyethylene glycol 17 gram packet; Commonly known as: Glycolax,   Miralax; Take 17 g by mouth 2 times a day.   sennosides-docusate sodium 8.6-50 mg tablet; Commonly known as:   Allyson-Colace; Take 2 tablets by mouth 2 times a day.     CHANGE how you take these medications     ibuprofen 200 mg tablet; Take 1 tablet (200 mg) by mouth every 8 hours   if needed for mild pain (1 - 3). Do not resume until follow up with   Neurosurgery; What changed: reasons to take this, additional instructions     CONTINUE taking these medications     eszopiclone 3 mg tablet; Commonly known as: Lunesta; Take 1 tablet (3   mg) by mouth once daily at bedtime. Take immediately before bedtime   melatonin 10 mg capsule   metoprolol succinate XL 25 mg 24 hr tablet; Commonly known as: Toprol-XL     STOP taking these medications     chlorhexidine 0.12 % solution; Commonly known as: Peridex   chlorhexidine 4 % external liquid; Commonly known as: Hibiclens     Outpatient Follow-Up  Future Appointments   Date Time Provider Department [place]   2/17/2025 12:00 PM Esdras Flanagan PA-C [id number] [place]   4/15/2025  9:30 AM Geraldo Clark MD PhD [id number] None       Mirta Salcido PA-C    Total face to face time spent with patient/family of >30 minutes, with >50% of the time spent discussing plan of care/management, counseling/educating on disease processes, explaining results of diagnostic testing.

## 2025-01-31 NOTE — PROGRESS NOTES
"Ernie Flores is a 74 y.o. male on day 4 of admission presenting with Lumbar pseudoarthrosis.    Subjective   Had BM yesterday, walked around    Objective     Physical Exam  A&Ox3  Face symmetric  RUE 5/5  LUE 5/5  RLE DF4 EHL 0 (baseline)  LLE HF 4+ o/w 5/5  Sensation intact to light touch throughout all extremities  Incisions c/d/i    Last Recorded Vitals  Blood pressure 143/70, pulse 109, temperature 36.9 °C (98.4 °F), temperature source Temporal, resp. rate 15, height 1.753 m (5' 9\"), weight 98.1 kg (216 lb 3.2 oz), SpO2 97%.  Intake/Output last 3 Shifts:  I/O last 3 completed shifts:  In: - (0 mL/kg)   Out: 595 (6.1 mL/kg) [Urine:570 (0.2 mL/kg/hr); Drains:25]  Weight: 98.1 kg     Relevant Results    Assessment/Plan   Assessment & Plan  Lumbar pseudoarthrosis    Spondylolisthesis of lumbar region    Bilateral lumbar radiculopathy    Patient is a 74 year old male w/ h/o BPH, cataracts, insomnia, IBS, cauda equina syndrome s/p L4-S1 decompression and fusion in 2022, p/w ASD    1/27 s/p L3/4, L4/5 prone XLiF, L5/S1 TLIF, L3-S1 extension/revision of fusion  1/29 drain dc'd    PLAN  Floor  AM labs, will trend white count  EOS when able  PTOT-rehab  SCDs, LVX (ppx)    Medically ready for discharge    Adama Thomas MD      "

## 2025-02-04 ENCOUNTER — APPOINTMENT (OUTPATIENT)
Facility: CLINIC | Age: 75
End: 2025-02-04
Payer: MEDICARE

## 2025-02-17 ENCOUNTER — APPOINTMENT (OUTPATIENT)
Dept: NEUROSURGERY | Facility: CLINIC | Age: 75
End: 2025-02-17
Payer: MEDICARE

## 2025-02-18 ENCOUNTER — APPOINTMENT (OUTPATIENT)
Dept: NEUROSURGERY | Facility: CLINIC | Age: 75
End: 2025-02-18
Payer: MEDICARE

## 2025-02-18 VITALS
WEIGHT: 214.4 LBS | BODY MASS INDEX: 30.69 KG/M2 | HEART RATE: 101 BPM | HEIGHT: 70 IN | TEMPERATURE: 97 F | SYSTOLIC BLOOD PRESSURE: 120 MMHG | DIASTOLIC BLOOD PRESSURE: 62 MMHG

## 2025-02-18 DIAGNOSIS — Z98.1 STATUS POST LUMBAR SPINAL FUSION: ICD-10-CM

## 2025-02-18 DIAGNOSIS — M43.16 SPONDYLOLISTHESIS OF LUMBAR REGION: ICD-10-CM

## 2025-02-18 DIAGNOSIS — M48.061 DEGENERATIVE LUMBAR SPINAL STENOSIS: Primary | ICD-10-CM

## 2025-02-18 PROCEDURE — 1111F DSCHRG MED/CURRENT MED MERGE: CPT | Performed by: PHYSICIAN ASSISTANT

## 2025-02-18 PROCEDURE — 1036F TOBACCO NON-USER: CPT | Performed by: PHYSICIAN ASSISTANT

## 2025-02-18 PROCEDURE — 1125F AMNT PAIN NOTED PAIN PRSNT: CPT | Performed by: PHYSICIAN ASSISTANT

## 2025-02-18 PROCEDURE — 99024 POSTOP FOLLOW-UP VISIT: CPT | Performed by: PHYSICIAN ASSISTANT

## 2025-02-18 PROCEDURE — 1159F MED LIST DOCD IN RCRD: CPT | Performed by: PHYSICIAN ASSISTANT

## 2025-02-18 PROCEDURE — 1157F ADVNC CARE PLAN IN RCRD: CPT | Performed by: PHYSICIAN ASSISTANT

## 2025-02-18 ASSESSMENT — LIFESTYLE VARIABLES
HOW OFTEN DO YOU HAVE A DRINK CONTAINING ALCOHOL: NEVER
HOW OFTEN DO YOU HAVE SIX OR MORE DRINKS ON ONE OCCASION: NEVER
SKIP TO QUESTIONS 9-10: 1
AUDIT-C TOTAL SCORE: 0
HOW MANY STANDARD DRINKS CONTAINING ALCOHOL DO YOU HAVE ON A TYPICAL DAY: PATIENT DOES NOT DRINK

## 2025-02-18 ASSESSMENT — PAIN SCALES - GENERAL: PAINLEVEL_OUTOF10: 6

## 2025-02-18 NOTE — PROGRESS NOTES
[facility]  Department of Neurological Surgery  Post Operative Patient Visit      History of Present Illness:  Ernie Flores is a 74 y.o. year old male who presents post revision extension of prior fusion to include L3-S1 along with XLIF by Dr. Geraldo Clark on January 27, 2025.  Patient has improvement in lower extremity numbness and tingling with a return of pins-and-needles from numbness explained.  He is weaned himself from prescription opioids and is used ibuprofen and acetaminophen for his mild to moderate pain.  Have discussed to avoid use of NSAIDs for benefit of bone fusion.  Inspection of his incisions show well-approximated, nonerythemic, nonedematous midline axial and left lateral incisions.  Referral to physical therapy provided as well as an order for x-rays to be negative prior to next visit with Dr. Clark.        14/14 systems reviewed and negative other than what is listed in the history of present illness    Patient Active Problem List   Diagnosis    Cauda equina syndrome (Multi)    Degenerative lumbar spinal stenosis    Spondylolisthesis of lumbar region    History of fusion of lumbar spine    Weakness of both lower extremities    Bilateral lumbar radiculopathy    Insomnia    Lumbar pseudoarthrosis     Past Medical History:   Diagnosis Date    Anemia     Bilateral lumbar radiculopathy     seen by Dr. Geraldo Clark 12/03/24    BPH (benign prostatic hyperplasia)     s/p ablation    Cataract     s/p correction    Cauda equina syndrome (Multi)     s/p Lumbar lamniectomy in 2022    Insomnia     managed with Lunesta    Irritable bowel syndrome     Spinal stenosis     Vision loss     wears glasses     Past Surgical History:   Procedure Laterality Date    APPENDECTOMY      in childhood    HERNIA REPAIR      b/l inguinal    KNEE ARTHROPLASTY      LUMBAR LAMINECTOMY       Social History     Tobacco Use    Smoking status: Never    Smokeless tobacco: Never   Substance Use  Topics    Alcohol use: Never     family history includes Cancer in his mother.    Current Outpatient Medications:     acetaminophen (Tylenol) 325 mg tablet, Take 2 tablets (650 mg) by mouth every 6 hours if needed for mild pain (1 - 3)., Disp: , Rfl:     cyclobenzaprine (Flexeril) 5 mg tablet, Take 1 tablet (5 mg) by mouth 3 times a day., Disp: , Rfl:     eszopiclone (Lunesta) 3 mg tablet, Take 1 tablet (3 mg) by mouth once daily at bedtime. Take immediately before bedtime, Disp: 90 tablet, Rfl: 0    ibuprofen 200 mg tablet, Take 1 tablet (200 mg) by mouth every 8 hours if needed for mild pain (1 - 3). Do not resume until follow up with Neurosurgery, Disp: , Rfl:     melatonin 10 mg capsule, Take 1 capsule (10 mg) by mouth once daily at bedtime., Disp: , Rfl:     metoprolol succinate XL (Toprol-XL) 25 mg 24 hr tablet, Take 1 tablet (25 mg) by mouth if needed. If pulse is greater than 100, Disp: , Rfl:     enoxaparin (Lovenox) 40 mg/0.4 mL syringe, Inject 0.4 mL (40 mg) under the skin once every 24 hours. Until increased ambulation (Patient not taking: Reported on 2/18/2025), Disp: , Rfl:     lidocaine 4 % patch, Place 1 patch over 12 hours on the skin once daily. Remove & discard patch within 12 hours or as directed by MD. (Patient not taking: Reported on 2/18/2025), Disp: , Rfl:     oxyCODONE (Roxicodone) 5 mg immediate release tablet, Take 1 tablet (5 mg) by mouth every 6 hours if needed for severe pain (7 - 10). (Patient not taking: Reported on 2/18/2025), Disp: , Rfl:     pantoprazole (ProtoNix) 40 mg EC tablet, Take 1 tablet (40 mg) by mouth once daily in the morning. Take before meals. Do not crush, chew, or split. (Patient not taking: Reported on 2/18/2025), Disp: , Rfl:     polyethylene glycol (Glycolax, Miralax) 17 gram packet, Take 17 g by mouth 2 times a day. (Patient not taking: Reported on 2/18/2025), Disp: , Rfl:     sennosides-docusate sodium (Allyson-Colace) 8.6-50 mg tablet, Take 2 tablets by mouth 2  times a day. (Patient not taking: Reported on 2/18/2025), Disp: , Rfl:   Allergies   Allergen Reactions    Cefuroxime Rash         The above clinical summary has been dictated with voice recognition software. It has not been proofread for grammatical errors, typographical mistakes, or other semantic inconsistencies.    Thank you for visiting our office today. It was our pleasure to take part in your healthcare.     Do not hesitate to call with any questions regarding your plan of care after leaving at [phone] M-F 8am-4pm.     To clinicians, thank you very much for this kind referral. It is a privilege to partner with you in the care of your patients. My office would be delighted to assist you with any further consultations or with questions regarding the plan of care outlined. Do not hesitate to call the office or contact me directly.       Sincerely,      AZUL Thomas, PAAsifC  Associate Physician Assistant, Neurosurgery  Clinical   [facility] [employer] [place] [address]    Phone: [phone]  Fax: [phone]

## 2025-04-15 ENCOUNTER — OFFICE VISIT (OUTPATIENT)
Facility: CLINIC | Age: 75
End: 2025-04-15
Payer: MEDICARE

## 2025-04-15 ENCOUNTER — HOSPITAL ENCOUNTER (OUTPATIENT)
Dept: RADIOLOGY | Facility: CLINIC | Age: 75
Discharge: HOME | End: 2025-04-15
Payer: MEDICARE

## 2025-04-15 VITALS
DIASTOLIC BLOOD PRESSURE: 78 MMHG | BODY MASS INDEX: 32.22 KG/M2 | TEMPERATURE: 98 F | WEIGHT: 217.5 LBS | HEIGHT: 69 IN | SYSTOLIC BLOOD PRESSURE: 120 MMHG | HEART RATE: 85 BPM

## 2025-04-15 DIAGNOSIS — M43.16 SPONDYLOLISTHESIS OF LUMBAR REGION: Primary | ICD-10-CM

## 2025-04-15 DIAGNOSIS — M43.16 SPONDYLOLISTHESIS OF LUMBAR REGION: ICD-10-CM

## 2025-04-15 DIAGNOSIS — Z98.1 STATUS POST LUMBAR SPINAL FUSION: ICD-10-CM

## 2025-04-15 DIAGNOSIS — M48.061 DEGENERATIVE LUMBAR SPINAL STENOSIS: ICD-10-CM

## 2025-04-15 PROCEDURE — 1157F ADVNC CARE PLAN IN RCRD: CPT | Performed by: NEUROLOGICAL SURGERY

## 2025-04-15 PROCEDURE — 1159F MED LIST DOCD IN RCRD: CPT | Performed by: NEUROLOGICAL SURGERY

## 2025-04-15 PROCEDURE — 72100 X-RAY EXAM L-S SPINE 2/3 VWS: CPT | Performed by: STUDENT IN AN ORGANIZED HEALTH CARE EDUCATION/TRAINING PROGRAM

## 2025-04-15 PROCEDURE — 1036F TOBACCO NON-USER: CPT | Performed by: NEUROLOGICAL SURGERY

## 2025-04-15 PROCEDURE — 72100 X-RAY EXAM L-S SPINE 2/3 VWS: CPT

## 2025-04-15 PROCEDURE — 99211 OFF/OP EST MAY X REQ PHY/QHP: CPT | Performed by: NEUROLOGICAL SURGERY

## 2025-04-15 PROCEDURE — 1125F AMNT PAIN NOTED PAIN PRSNT: CPT | Performed by: NEUROLOGICAL SURGERY

## 2025-04-15 ASSESSMENT — PAIN SCALES - GENERAL: PAINLEVEL_OUTOF10: 6

## 2025-04-15 NOTE — PROGRESS NOTES
[facility] [place]  Department of Neurological Surgery  Post Operative Patient Visit      History of Present Illness:  Ernie Flores is a 74 y.o. year old male who presents to the spine clinic for a post operative visit. They are status post L3-4 and L4-5 XLIF, L5-S1 TLIF and L3-S1 posterior spinal fusion on January 27, 2025.  At this time, he states that his low back feels pretty good.  He has really good range of motion.  He does have some persistent numbness down the left leg as well as persistent weakness in his right foot.  He also reports some increased urinary frequency since his surgery.  He has not done any formal physical therapy since his surgery, but is doing a home exercise program.  He takes an average of 2 regular strength Advil daily.    14/14 systems reviewed and negative other than what is listed in the history of present illness    Patient Active Problem List   Diagnosis    Cauda equina syndrome (Multi)    Degenerative lumbar spinal stenosis    Spondylolisthesis of lumbar region    History of fusion of lumbar spine    Weakness of both lower extremities    Bilateral lumbar radiculopathy    Insomnia    Lumbar pseudoarthrosis     Past Medical History:   Diagnosis Date    Anemia     Bilateral lumbar radiculopathy     seen by Dr. Geraldo Clark 12/03/24    BPH (benign prostatic hyperplasia)     s/p ablation    Cataract     s/p correction    Cauda equina syndrome (Multi)     s/p Lumbar lamniectomy in 2022    Insomnia     managed with Lunesta    Irritable bowel syndrome     Spinal stenosis     Vision loss     wears glasses     Past Surgical History:   Procedure Laterality Date    APPENDECTOMY      in childhood    HERNIA REPAIR      b/l inguinal    KNEE ARTHROPLASTY      LUMBAR LAMINECTOMY       Social History     Tobacco Use    Smoking status: Never    Smokeless tobacco: Never   Substance Use Topics    Alcohol use: Never     family history includes Cancer in his mother.    Current  Outpatient Medications:     acetaminophen (Tylenol) 325 mg tablet, Take 2 tablets (650 mg) by mouth every 6 hours if needed for mild pain (1 - 3)., Disp: , Rfl:     cyclobenzaprine (Flexeril) 5 mg tablet, Take 1 tablet (5 mg) by mouth 3 times a day. (Patient taking differently: Take 1 tablet (5 mg) by mouth 3 times a day as needed.), Disp: , Rfl:     eszopiclone (Lunesta) 3 mg tablet, Take 1 tablet (3 mg) by mouth once daily at bedtime. Take immediately before bedtime, Disp: 90 tablet, Rfl: 0    ibuprofen 200 mg tablet, Take 1 tablet (200 mg) by mouth every 8 hours if needed for mild pain (1 - 3). Do not resume until follow up with Neurosurgery, Disp: , Rfl:     metoprolol succinate XL (Toprol-XL) 25 mg 24 hr tablet, Take 1 tablet (25 mg) by mouth if needed. If pulse is greater than 100, Disp: , Rfl:   Allergies   Allergen Reactions    Cefuroxime Rash       Physical Examination:  General: well developed, awake/alert/oriented x3, no distress, alert and cooperative  Head/Neck: neck Supple, no apparent injury  ENMT: mucous membranes moist, no apparent injury, no lesions seen  Cardiovascular: no pitting edema, no JVD  Respiratory/Thorax: Normal breath sounds with good chest expansion, thorax symmetric  Skin: well-healed incisions    Neurological/Musculoskeletal:    - Posture: normal coronal & sagittal alignment    - Range of motion: full active & passive range of motion    - Muscle Bulk: normal and symmetric in all extremities    - Paraspinal muscle spasm/tenderness absent    - Motor Strength: 4/5 strength in right dorsiflexion, otherwise 5/5 strength throughout bilateral lower extremities    - Sensation: Decreased over the anterolateral aspect of the left lower leg      Results  I personally reviewed and interpreted the imaging results, which included x-rays of the lumbar spine performed on April 15, 2025.  These show stable and well-positioned instrumentation from L3-S1.  There has been interval development of a  worsening retrolisthesis at L2-3 compared to the initial postop x-rays.    Assessment/Plan   Ernie Flores is a 74 y.o. year old male who presents to the spine clinic for a post operative visit. They are status post L3-4 and L4-5 XLIF, L5-S1 TLIF and L3-S1 posterior spinal fusion on January 27, 2025.  At this time, he states that his low back feels pretty good.  He has really good range of motion.  He does have some persistent numbness down the left leg as well as persistent weakness in his right foot.  He also reports some increased urinary frequency since his surgery.  He has not done any formal physical therapy since his surgery, but is doing a home exercise program.  He takes an average of 2 regular strength Advil daily.  His new x-rays look good, although there has been interval development of a L2-3 retrolisthesis.  However, he denies any significant back pain at this time and is overall very happy with the results of his surgery.  I will plan to see him back in clinic another 3 months.  He will have new x-rays at that time.      The above clinical summary has been dictated with voice recognition software. It has not been proofread for grammatical errors, typographical mistakes, or other semantic inconsistencies.    Thank you for visiting our office today. It was our pleasure to take part in your healthcare.     Do not hesitate to call with any questions regarding your plan of care after leaving at [phone] M-F 8am-4pm.     To clinicians, thank you very much for this kind referral. It is a privilege to partner with you in the care of your patients. My office would be delighted to assist you with any further consultations or with questions regarding the plan of care outlined. Do not hesitate to call the office or contact me directly.       Sincerely,      Geraldo Clark MD PhD  Attending Neurosurgeon  [facility]   of Neurological Surgery  [facility]  [employer] [facility] [facility]  [address]  [address]  [place] [address]    [facility]  [address]  [address]  [place] [address]    Office: [phone]  Fax: [phone]

## 2025-04-24 ENCOUNTER — APPOINTMENT (OUTPATIENT)
Facility: CLINIC | Age: 75
End: 2025-04-24
Payer: MEDICARE

## 2025-08-12 ENCOUNTER — HOSPITAL ENCOUNTER (OUTPATIENT)
Dept: RADIOLOGY | Facility: CLINIC | Age: 75
Discharge: HOME | End: 2025-08-12
Payer: MEDICARE

## 2025-08-12 ENCOUNTER — OFFICE VISIT (OUTPATIENT)
Facility: CLINIC | Age: 75
End: 2025-08-12
Payer: MEDICARE

## 2025-08-12 VITALS
TEMPERATURE: 97.4 F | WEIGHT: 225.5 LBS | HEART RATE: 87 BPM | DIASTOLIC BLOOD PRESSURE: 70 MMHG | HEIGHT: 69 IN | BODY MASS INDEX: 33.4 KG/M2 | SYSTOLIC BLOOD PRESSURE: 128 MMHG

## 2025-08-12 DIAGNOSIS — M54.16 BILATERAL LUMBAR RADICULOPATHY: ICD-10-CM

## 2025-08-12 DIAGNOSIS — Z98.1 STATUS POST LUMBAR SPINAL FUSION: ICD-10-CM

## 2025-08-12 DIAGNOSIS — M43.16 SPONDYLOLISTHESIS OF LUMBAR REGION: ICD-10-CM

## 2025-08-12 DIAGNOSIS — Z98.1 STATUS POST LUMBAR SPINAL FUSION: Primary | ICD-10-CM

## 2025-08-12 PROCEDURE — 1125F AMNT PAIN NOTED PAIN PRSNT: CPT | Performed by: NEUROLOGICAL SURGERY

## 2025-08-12 PROCEDURE — 99213 OFFICE O/P EST LOW 20 MIN: CPT | Performed by: NEUROLOGICAL SURGERY

## 2025-08-12 PROCEDURE — 1036F TOBACCO NON-USER: CPT | Performed by: NEUROLOGICAL SURGERY

## 2025-08-12 PROCEDURE — 99213 OFFICE O/P EST LOW 20 MIN: CPT

## 2025-08-12 PROCEDURE — 72110 X-RAY EXAM L-2 SPINE 4/>VWS: CPT | Performed by: STUDENT IN AN ORGANIZED HEALTH CARE EDUCATION/TRAINING PROGRAM

## 2025-08-12 PROCEDURE — 72110 X-RAY EXAM L-2 SPINE 4/>VWS: CPT

## 2025-08-12 PROCEDURE — 1159F MED LIST DOCD IN RCRD: CPT | Performed by: NEUROLOGICAL SURGERY

## 2025-08-12 ASSESSMENT — PAIN SCALES - GENERAL: PAINLEVEL_OUTOF10: 4

## 2025-08-12 ASSESSMENT — PATIENT HEALTH QUESTIONNAIRE - PHQ9
2. FEELING DOWN, DEPRESSED OR HOPELESS: NOT AT ALL
1. LITTLE INTEREST OR PLEASURE IN DOING THINGS: NOT AT ALL
SUM OF ALL RESPONSES TO PHQ9 QUESTIONS 1 & 2: 0

## (undated) DEVICE — COVER, CART, 45 X 27 X 48 IN, CLEAR

## (undated) DEVICE — ELECTRODE, ELECTROSURGICAL, BLADE, INSULATED, ENT/IMA, STERILE

## (undated) DEVICE — SPHERE, STEALTHSTATION, 5-PK

## (undated) DEVICE — COVER, TABLE, UHC

## (undated) DEVICE — DRESSING, MEPILEX, BORDER FLEX, 3 X 3

## (undated) DEVICE — TUBING, SUCTION, CONNECTING, STERILE 0.25 X 120 IN., LF

## (undated) DEVICE — TMAP ACTIVATOR AND STIM ELECTRODE KIT

## (undated) DEVICE — DRESSING, MEPILEX, POST OP, 8 X 4

## (undated) DEVICE — Device

## (undated) DEVICE — ELECTRODE, ELECTROSURGICAL, BLADE EXT 4 INCH, INSULATED

## (undated) DEVICE — TIP,  ELECTRODE COATED INSULATED, EXTENDED, LF

## (undated) DEVICE — TAPE, SILK, DURAPORE, 3 IN X 10 YD, LF

## (undated) DEVICE — DRAPE, SHEET, FAN FOLDED, HALF, 44 X 58 IN, DISPOSABLE, LF, STERILE

## (undated) DEVICE — KIT, XLIF NVM5 DISP

## (undated) DEVICE — SPONGE, HEMOSTATIC, GELATIN, SURGIFOAM, 8 X 12.5 CM X 10 MM

## (undated) DEVICE — BONE, MILL, MIDAS REX, DUAL BLADE, ELECTRIC

## (undated) DEVICE — GOWN, SURGICAL, SMARTGOWN, XLARGE, STERILE

## (undated) DEVICE — KIT, PATIENT CARE, JACKSON TABLE W/PRONE-SAFE HEADREST

## (undated) DEVICE — PROTECTOR, NERVE, ULNAR, PINK

## (undated) DEVICE — MARKER, SKIN, RULER AND LABEL PACK, CUSTOM

## (undated) DEVICE — APPLICATOR, CHLORAPREP, W/ORANGE TINT, 26ML

## (undated) DEVICE — SUTURE, VICRYL, 0, 18 IN, CT-1, UNDYED

## (undated) DEVICE — TUBING, SMOKE EVAC, 3/8 X 10 FT

## (undated) DEVICE — WOUND SYSTEM, DEBRIDEMENT & CLEANING, O.R DUOPAK

## (undated) DEVICE — ACCESS KIT, MAXCESS 4 SURGICAL

## (undated) DEVICE — INSTRUMENT, CONICAL TIP, INSTAFILL, DISP

## (undated) DEVICE — NVM5 NEEDLE MODULE, MEP/EMG

## (undated) DEVICE — MANIFOLD, 4 PORT NEPTUNE STANDARD

## (undated) DEVICE — CLOSURE SYSTEM, DERMABOND, PRINEO, 22CM, STERILE

## (undated) DEVICE — DRAPE, C-ARM IMAGE

## (undated) DEVICE — PATIENT SPECIFIC UNID TECHNOLOGY

## (undated) DEVICE — SEALER, BIPOLAR, AQUA MANTYS 6.0

## (undated) DEVICE — PAD, GROUNDING, ELECTROSURGICAL, W/9 FT CABLE, POLYHESIVE II, ADULT, LF

## (undated) DEVICE — SEALANT, HEMOSTATIC, FLOSEAL, 10 ML

## (undated) DEVICE — INSTRUMENT, DISSECTING, ENDOSCOPIC, PEANUT, 5 MM, STERILE

## (undated) DEVICE — CATHETER TRAY, SURESTEP, 16FR, URINE METER W/STATLOCK